# Patient Record
Sex: MALE | Race: WHITE | NOT HISPANIC OR LATINO | URBAN - NONMETROPOLITAN AREA
[De-identification: names, ages, dates, MRNs, and addresses within clinical notes are randomized per-mention and may not be internally consistent; named-entity substitution may affect disease eponyms.]

---

## 2017-02-27 ENCOUNTER — DOCTOR'S OFFICE (OUTPATIENT)
Dept: URBAN - NONMETROPOLITAN AREA CLINIC 1 | Facility: CLINIC | Age: 67
Setting detail: OPHTHALMOLOGY
End: 2017-02-27
Payer: COMMERCIAL

## 2017-02-27 DIAGNOSIS — H02.135: ICD-10-CM

## 2017-02-27 DIAGNOSIS — H43.811: ICD-10-CM

## 2017-02-27 DIAGNOSIS — H10.13: ICD-10-CM

## 2017-02-27 DIAGNOSIS — H01.004: ICD-10-CM

## 2017-02-27 DIAGNOSIS — H02.132: ICD-10-CM

## 2017-02-27 DIAGNOSIS — H40.10X1: ICD-10-CM

## 2017-02-27 DIAGNOSIS — H25.13: ICD-10-CM

## 2017-02-27 DIAGNOSIS — H04.123: ICD-10-CM

## 2017-02-27 DIAGNOSIS — H02.403: ICD-10-CM

## 2017-02-27 DIAGNOSIS — H01.001: ICD-10-CM

## 2017-02-27 PROCEDURE — 92133 CPTRZD OPH DX IMG PST SGM ON: CPT | Performed by: OPHTHALMOLOGY

## 2017-02-27 PROCEDURE — 92014 COMPRE OPH EXAM EST PT 1/>: CPT | Performed by: OPHTHALMOLOGY

## 2017-02-27 ASSESSMENT — REFRACTION_MANIFEST
OD_VA1: 20/
OS_VA3: 20/
OD_VA1: 20/
OS_VA1: 20/
OD_VA2: 20/
OS_VA1: 20/
OS_VA3: 20/
OD_VA3: 20/
OU_VA: 20/
OS_VA2: 20/
OU_VA: 20/
OS_VA2: 20/
OS_VA3: 20/
OD_VA2: 20/
OS_VA2: 20/
OU_VA: 20/
OD_VA3: 20/
OD_VA1: 20/
OD_VA2: 20/
OS_VA1: 20/
OD_VA3: 20/

## 2017-02-27 ASSESSMENT — LID EXAM ASSESSMENTS
OS_BLEPHARITIS: 1+
OD_BLEPHARITIS: 1+
OD_MEIBOMITIS: 1+
OS_MEIBOMITIS: 1+

## 2017-02-27 ASSESSMENT — REFRACTION_AUTOREFRACTION
OS_SPHERE: +2.00
OS_CYLINDER: -1.50
OD_AXIS: 179
OD_SPHERE: 0.00
OD_CYLINDER: -1.00
OS_AXIS: 17

## 2017-02-27 ASSESSMENT — LID POSITION - ECTROPION
OS_ECTROPION: LLL
OD_ECTROPION: RLL

## 2017-02-27 ASSESSMENT — SUPERFICIAL PUNCTATE KERATITIS (SPK)
OD_SPK: T
OS_SPK: 1+

## 2017-02-27 ASSESSMENT — VISUAL ACUITY
OS_BCVA: 20/30-2
OD_BCVA: 20/25+2

## 2017-02-27 ASSESSMENT — REFRACTION_CURRENTRX
OD_OVR_VA: 20/
OD_OVR_VA: 20/
OS_OVR_VA: 20/
OS_OVR_VA: 20/
OD_OVR_VA: 20/
OS_OVR_VA: 20/

## 2017-02-27 ASSESSMENT — SPHEQUIV_DERIVED
OD_SPHEQUIV: -0.5
OS_SPHEQUIV: 1.25

## 2017-02-27 ASSESSMENT — CONFRONTATIONAL VISUAL FIELD TEST (CVF)
OD_FINDINGS: FULL
OS_FINDINGS: FULL

## 2017-08-21 ENCOUNTER — DOCTOR'S OFFICE (OUTPATIENT)
Dept: URBAN - NONMETROPOLITAN AREA CLINIC 1 | Facility: CLINIC | Age: 67
Setting detail: OPHTHALMOLOGY
End: 2017-08-21
Payer: COMMERCIAL

## 2017-08-21 DIAGNOSIS — H40.033: ICD-10-CM

## 2017-08-21 DIAGNOSIS — H25.13: ICD-10-CM

## 2017-08-21 DIAGNOSIS — H04.123: ICD-10-CM

## 2017-08-21 DIAGNOSIS — H43.811: ICD-10-CM

## 2017-08-21 DIAGNOSIS — H02.135: ICD-10-CM

## 2017-08-21 DIAGNOSIS — H02.132: ICD-10-CM

## 2017-08-21 DIAGNOSIS — H02.403: ICD-10-CM

## 2017-08-21 DIAGNOSIS — H40.003: ICD-10-CM

## 2017-08-21 PROBLEM — H10.13 ALLERGIC CONJUNCTIVITS; BOTH EYES: Status: RESOLVED | Noted: 2017-02-27 | Resolved: 2017-08-21

## 2017-08-21 PROBLEM — H01.001 BLEPHARITIS; RIGHT UPPER LID, LEFT UPPER LID: Status: RESOLVED | Noted: 2017-02-27 | Resolved: 2017-08-21

## 2017-08-21 PROBLEM — H01.004 BLEPHARITIS; RIGHT UPPER LID, LEFT UPPER LID: Status: RESOLVED | Noted: 2017-02-27 | Resolved: 2017-08-21

## 2017-08-21 PROCEDURE — 92014 COMPRE OPH EXAM EST PT 1/>: CPT | Performed by: OPHTHALMOLOGY

## 2017-08-21 PROCEDURE — 92083 EXTENDED VISUAL FIELD XM: CPT | Performed by: OPHTHALMOLOGY

## 2017-08-21 PROCEDURE — 92132 CPTRZD OPH DX IMG ANT SGM: CPT | Performed by: OPHTHALMOLOGY

## 2017-08-21 PROCEDURE — 76514 ECHO EXAM OF EYE THICKNESS: CPT | Performed by: OPHTHALMOLOGY

## 2017-08-21 ASSESSMENT — REFRACTION_MANIFEST
OS_VA2: 20/
OS_VA1: 20/
OS_VA2: 20/
OD_VA3: 20/
OU_VA: 20/
OD_VA3: 20/
OD_VA2: 20/
OD_VA3: 20/
OS_VA2: 20/
OD_VA1: 20/
OS_VA1: 20/
OD_VA1: 20/
OS_VA1: 20/
OD_VA2: 20/
OS_VA3: 20/
OD_VA2: 20/
OD_VA1: 20/
OS_VA3: 20/
OU_VA: 20/
OU_VA: 20/
OS_VA3: 20/

## 2017-08-21 ASSESSMENT — REFRACTION_CURRENTRX
OS_OVR_VA: 20/
OD_OVR_VA: 20/
OS_OVR_VA: 20/
OD_OVR_VA: 20/
OD_OVR_VA: 20/
OS_OVR_VA: 20/

## 2017-08-21 ASSESSMENT — VISUAL ACUITY
OD_BCVA: 20/25
OS_BCVA: 20/30

## 2017-08-21 ASSESSMENT — PACHYMETRY
OD_CT_UM: 608
OS_CT_UM: 609
OS_CT_CORRECTION: -4
OD_CT_CORRECTION: -4

## 2017-08-21 ASSESSMENT — REFRACTION_AUTOREFRACTION
OS_SPHERE: +1.00
OS_AXIS: 17
OD_CYLINDER: -1.00
OD_SPHERE: +0.50
OS_CYLINDER: -1.50
OD_AXIS: 179

## 2017-08-21 ASSESSMENT — CONFRONTATIONAL VISUAL FIELD TEST (CVF)
OS_FINDINGS: FULL
OD_FINDINGS: FULL

## 2017-08-21 ASSESSMENT — LID POSITION - ECTROPION
OD_ECTROPION: RLL
OS_ECTROPION: LLL

## 2017-08-21 ASSESSMENT — SPHEQUIV_DERIVED
OS_SPHEQUIV: 0.25
OD_SPHEQUIV: 0

## 2017-08-21 ASSESSMENT — SUPERFICIAL PUNCTATE KERATITIS (SPK)
OS_SPK: 1+
OD_SPK: 1+

## 2018-04-16 ENCOUNTER — DOCTOR'S OFFICE (OUTPATIENT)
Dept: URBAN - NONMETROPOLITAN AREA CLINIC 1 | Facility: CLINIC | Age: 68
Setting detail: OPHTHALMOLOGY
End: 2018-04-16
Payer: COMMERCIAL

## 2018-04-16 DIAGNOSIS — H43.811: ICD-10-CM

## 2018-04-16 DIAGNOSIS — H02.135: ICD-10-CM

## 2018-04-16 DIAGNOSIS — H02.403: ICD-10-CM

## 2018-04-16 DIAGNOSIS — H04.121: ICD-10-CM

## 2018-04-16 DIAGNOSIS — H04.123: ICD-10-CM

## 2018-04-16 DIAGNOSIS — H40.003: ICD-10-CM

## 2018-04-16 DIAGNOSIS — H25.13: ICD-10-CM

## 2018-04-16 DIAGNOSIS — H04.122: ICD-10-CM

## 2018-04-16 DIAGNOSIS — H02.132: ICD-10-CM

## 2018-04-16 DIAGNOSIS — H40.033: ICD-10-CM

## 2018-04-16 PROCEDURE — 92014 COMPRE OPH EXAM EST PT 1/>: CPT | Performed by: OPHTHALMOLOGY

## 2018-04-16 PROCEDURE — 92133 CPTRZD OPH DX IMG PST SGM ON: CPT | Performed by: OPHTHALMOLOGY

## 2018-04-16 ASSESSMENT — CONFRONTATIONAL VISUAL FIELD TEST (CVF)
OS_FINDINGS: FULL
OD_FINDINGS: FULL

## 2018-04-16 ASSESSMENT — REFRACTION_MANIFEST
OD_VA1: 20/
OD_VA2: 20/
OS_VA1: 20/
OU_VA: 20/
OS_VA2: 20/
OS_VA2: 20/
OD_VA2: 20/
OU_VA: 20/
OS_VA3: 20/
OS_VA1: 20/
OU_VA: 20/
OD_VA3: 20/
OD_VA3: 20/
OD_VA1: 20/
OS_VA3: 20/
OD_VA2: 20/
OS_VA3: 20/
OD_VA3: 20/
OS_VA1: 20/
OD_VA1: 20/
OS_VA2: 20/

## 2018-04-16 ASSESSMENT — VISUAL ACUITY
OD_BCVA: 20/30+2
OS_BCVA: 20/25-1

## 2018-04-16 ASSESSMENT — REFRACTION_AUTOREFRACTION
OD_CYLINDER: -1.00
OD_AXIS: 179
OS_AXIS: 17
OS_SPHERE: +1.00
OS_CYLINDER: -1.50
OD_SPHERE: +0.50

## 2018-04-16 ASSESSMENT — SPHEQUIV_DERIVED
OS_SPHEQUIV: 0.25
OD_SPHEQUIV: 0

## 2018-04-16 ASSESSMENT — REFRACTION_CURRENTRX
OS_OVR_VA: 20/
OS_OVR_VA: 20/
OD_OVR_VA: 20/
OS_OVR_VA: 20/
OD_OVR_VA: 20/
OD_OVR_VA: 20/

## 2018-04-16 ASSESSMENT — LID POSITION - ECTROPION
OS_ECTROPION: LLL
OD_ECTROPION: RLL

## 2018-04-16 ASSESSMENT — SUPERFICIAL PUNCTATE KERATITIS (SPK)
OS_SPK: 1+
OD_SPK: ABSENT

## 2022-10-03 ENCOUNTER — OFFICE VISIT (OUTPATIENT)
Dept: FAMILY MEDICINE CLINIC | Facility: CLINIC | Age: 72
End: 2022-10-03
Payer: COMMERCIAL

## 2022-10-03 VITALS
BODY MASS INDEX: 30.49 KG/M2 | OXYGEN SATURATION: 95 % | SYSTOLIC BLOOD PRESSURE: 116 MMHG | HEART RATE: 84 BPM | HEIGHT: 65 IN | DIASTOLIC BLOOD PRESSURE: 78 MMHG | TEMPERATURE: 98.7 F | WEIGHT: 182.98 LBS

## 2022-10-03 DIAGNOSIS — N40.0 BENIGN PROSTATIC HYPERPLASIA WITHOUT LOWER URINARY TRACT SYMPTOMS: ICD-10-CM

## 2022-10-03 DIAGNOSIS — H61.23 IMPACTED CERUMEN OF BOTH EARS: ICD-10-CM

## 2022-10-03 DIAGNOSIS — Z23 FLU VACCINE NEED: ICD-10-CM

## 2022-10-03 DIAGNOSIS — Z00.00 ENCOUNTER FOR ANNUAL PHYSICAL EXAM: Primary | ICD-10-CM

## 2022-10-03 DIAGNOSIS — R53.81 PHYSICAL DECONDITIONING: ICD-10-CM

## 2022-10-03 DIAGNOSIS — F01.50 MULTI-INFARCT DEMENTIA, UNCOMPLICATED (HCC): ICD-10-CM

## 2022-10-03 DIAGNOSIS — I10 PRIMARY HYPERTENSION: ICD-10-CM

## 2022-10-03 DIAGNOSIS — M10.079 ACUTE IDIOPATHIC GOUT OF FOOT, UNSPECIFIED LATERALITY: ICD-10-CM

## 2022-10-03 PROCEDURE — G0008 ADMIN INFLUENZA VIRUS VAC: HCPCS

## 2022-10-03 PROCEDURE — 99204 OFFICE O/P NEW MOD 45 MIN: CPT | Performed by: PHYSICIAN ASSISTANT

## 2022-10-03 PROCEDURE — 90662 IIV NO PRSV INCREASED AG IM: CPT

## 2022-10-03 RX ORDER — VITAMIN B COMPLEX
1 CAPSULE ORAL DAILY
COMMUNITY

## 2022-10-03 RX ORDER — MEMANTINE HYDROCHLORIDE 10 MG/1
10 TABLET ORAL 2 TIMES DAILY
COMMUNITY
Start: 2022-09-15

## 2022-10-03 RX ORDER — DONEPEZIL HYDROCHLORIDE 10 MG/1
10 TABLET, FILM COATED ORAL DAILY
COMMUNITY
Start: 2022-08-28

## 2022-10-03 RX ORDER — CARVEDILOL 6.25 MG/1
6.25 TABLET ORAL DAILY
COMMUNITY
Start: 2022-08-09

## 2022-10-03 RX ORDER — ASPIRIN 325 MG
325 TABLET ORAL DAILY
COMMUNITY
End: 2022-10-06 | Stop reason: CLARIF

## 2022-10-03 RX ORDER — ALLOPURINOL 100 MG/1
100 TABLET ORAL DAILY
COMMUNITY
Start: 2022-08-29

## 2022-10-03 NOTE — PATIENT INSTRUCTIONS
Patient is going to have lab tests done so that we can do a full evaluation  He is going to be fasting for his lipid profile  We are also going to do his complete metabolic profile and look at his lipids, kidney function, and liver function  We will also be looking for anemia and checking his MCV to see if he is anemic to see what type of anemia he has  We will assess his hemoglobin hematocrit platelets and white count  I am encouraging the patient to exercise every day  He is going to come back in in 1 week to have his ear wax impaction removed and he is going to bring me a list of how long he has been walking every day in that 1 week  This is very important for him to exercise his body and to continue to do challenging exercises for his mind  He feels that the Namenda and the Aricept are slowing the memory loss down  He is no longer driving  I think that is a good idea because if he would get confused while he is driving he may get lost in unable to find his way home  I will see him in 1 week and hopefully we can have some labs back by that time and have a discussion about his labs
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
## 2022-10-06 RX ORDER — ASPIRIN 81 MG/1
81 TABLET ORAL
COMMUNITY

## 2022-10-10 ENCOUNTER — OFFICE VISIT (OUTPATIENT)
Dept: FAMILY MEDICINE CLINIC | Facility: CLINIC | Age: 72
End: 2022-10-10
Payer: COMMERCIAL

## 2022-10-10 VITALS
SYSTOLIC BLOOD PRESSURE: 128 MMHG | HEIGHT: 65 IN | BODY MASS INDEX: 30.49 KG/M2 | DIASTOLIC BLOOD PRESSURE: 80 MMHG | HEART RATE: 67 BPM | OXYGEN SATURATION: 98 % | TEMPERATURE: 98.2 F | WEIGHT: 182.98 LBS

## 2022-10-10 DIAGNOSIS — H61.23 BILATERAL IMPACTED CERUMEN: Primary | ICD-10-CM

## 2022-10-10 PROCEDURE — 69209 REMOVE IMPACTED EAR WAX UNI: CPT | Performed by: PHYSICIAN ASSISTANT

## 2022-10-10 PROCEDURE — 99213 OFFICE O/P EST LOW 20 MIN: CPT | Performed by: PHYSICIAN ASSISTANT

## 2022-10-10 NOTE — PROGRESS NOTES
Assessment/Plan:       1  Bilateral impacted cerumen  -     Ear cerumen removal  -     Ear cerumen removal    patient can in the office for the expressed purpose of having bilateral cerumen impaction removed  Patient had decreased hearing prior to the procedure  The impacted cerumen was identified during his last office visit and a recommendation was made to have Debrox ear drops in order to soften the cerumen prior to the flushing of the ears  Subjective:  Patient sees [address] [place] [address] primary care for primary care services     Patient ID: Rita Villanueva is a 74 y o  male  HPI : A 49-year-old male has established care with me as his primary care provider  On his last examination, it was noted he had bilateral cerumen impaction along with decreased hearing  Used Debrox ear drops on a daily basis NAD appointment to have the ear flushing performed  The following portions of the patient's history were reviewed and updated as appropriate: allergies, current medications, past family history, past medical history, past social history, past surgical history, and problem list     Review of Systems  patient admits having a hard time hearing for quite some time  He does have bilateral hearing aids that he intermittently uses  He was accompanied in the office by his wife who noted that the patient has difficulty hearing and that this hearing is worse in latter rooms  There is no tinnitus or vertigo  Objective:  Prior to the procedure, the patient had examination showing bilateral cerumen impaction left ear greater than right ear  /80 (BP Location: Right arm, Patient Position: Sitting)   Pulse 67   Temp 98 2 °F (36 8 °C) (Oral)   Ht 5' 5" (1 651 m)   Wt 83 kg (182 lb 15 7 oz)   SpO2 98%   BMI 30 45 kg/m²          Physical Exam  bilateral cerumen impaction noted prior to the flushing of the ear canals  Following the procedure, ear canals were clear without any retained cerumen

## 2022-10-10 NOTE — PATIENT INSTRUCTIONS
Patient was here today with bilateral cerumen impaction that was identified at his last visit  In order to expedite removal the patient has been using Debrox ear drops  Patient had flushing of both canals left ear demonstrated more wax removal than on the right side  Patient tolerated the procedure well without any vertigo or tinnitus  Santos test showed that equal hearing with the tuning fork  Air conduction greater than bone conduction on both sides  Had difficulty hearing whispered voice and difficulty hearing with finger rub bilaterally  He does have a hearing aid that he uses at home  I will see the patient in 3 months for recheck

## 2022-10-10 NOTE — PROGRESS NOTES
Name: Deborah Apodaca      : 1950      MRN: [id number]  Encounter Provider: Ila Guallpa PA-C  Encounter Date: 10/10/2022   [facility]    Assessment & Plan    1  Bilateral impacted cerumen  -     Ear cerumen removal  -     Ear cerumen removal    Subjective    HPI     Objective    /80 (BP Location: Right arm, Patient Position: Sitting)   Pulse 67   Temp 98 2 °F (36 8 °C) (Oral)   Ht 5' 5" (1 651 m)   Wt 83 kg (182 lb 15 7 oz)   SpO2 98%   BMI 30 45 kg/m²      Ear cerumen removal    Date/Time: 10/10/2022 3:19 PM  Performed by: Ila Guallpa PA-C  Authorized by: Ila Guallpa PA-C   [place] Protocol:  Consent: Verbal consent obtained  Written consent not obtained  Risks and benefits: risks, benefits and alternatives were discussed  Consent given by: patient  Time out: Immediately prior to procedure a "time out" was called to verify the correct patient, procedure, equipment, support staff and site/side marked as required  Timeout called at: 10/10/2022 3:20 AM   Patient understanding: patient states understanding of the procedure being performed  Patient consent: the patient's understanding of the procedure matches consent given  Procedure consent: procedure consent matches procedure scheduled  Relevant documents: relevant documents present and verified  Test results: test results available and properly labeled  Site marked: the operative site was not marked  Radiology Images displayed and confirmed  If images not available, report reviewed: imaging studies available      Patient location:  Clinic  Indications / Diagnosis:  Bilateral cerumen impaction  Procedure details:     Local anesthetic:  None    Location:  L ear and R ear    Procedure type: irrigation only      Visualization (free text):   Was present prior to procedure and clean canals following procedure    Equipment used:  Ear lavage with warm water and H202  Post-procedure details:     Complication:  None    Hearing quality:  Improved    Patient tolerance of procedure: Tolerated well, no immediate complications  Comments:      Santos test midline  AC> BC bilaterally for Rinne test  Still unable to hear finger rub, whispering bilaterally-- wears hearing aids and these were not applied following procedure         Jurgen Ellington PA-C

## 2022-10-24 ENCOUNTER — APPOINTMENT (OUTPATIENT)
Dept: LAB | Facility: CLINIC | Age: 72
End: 2022-10-24
Payer: COMMERCIAL

## 2022-10-24 DIAGNOSIS — I10 PRIMARY HYPERTENSION: ICD-10-CM

## 2022-10-24 DIAGNOSIS — Z00.00 ENCOUNTER FOR ANNUAL PHYSICAL EXAM: ICD-10-CM

## 2022-10-24 LAB
ALBUMIN SERPL BCP-MCNC: 3.6 G/DL (ref 3.5–5)
ALP SERPL-CCNC: 64 U/L (ref 46–116)
ALT SERPL W P-5'-P-CCNC: 28 U/L (ref 12–78)
ANION GAP SERPL CALCULATED.3IONS-SCNC: 5 MMOL/L (ref 4–13)
AST SERPL W P-5'-P-CCNC: 22 U/L (ref 5–45)
BILIRUB SERPL-MCNC: 1.05 MG/DL (ref 0.2–1)
BUN SERPL-MCNC: 20 MG/DL (ref 5–25)
CALCIUM SERPL-MCNC: 9.1 MG/DL (ref 8.3–10.1)
CHLORIDE SERPL-SCNC: 108 MMOL/L (ref 96–108)
CHOLEST SERPL-MCNC: 184 MG/DL
CO2 SERPL-SCNC: 28 MMOL/L (ref 21–32)
CREAT SERPL-MCNC: 0.79 MG/DL (ref 0.6–1.3)
ERYTHROCYTE [DISTWIDTH] IN BLOOD BY AUTOMATED COUNT: 13.7 % (ref 11.6–15.1)
GFR SERPL CREATININE-BSD FRML MDRD: 88 ML/MIN/1.73SQ M
GLUCOSE P FAST SERPL-MCNC: 98 MG/DL (ref 65–99)
HCT VFR BLD AUTO: 47.3 % (ref 36.5–49.3)
HDLC SERPL-MCNC: 77 MG/DL
HGB BLD-MCNC: 15.7 G/DL (ref 12–17)
LDLC SERPL CALC-MCNC: 98 MG/DL (ref 0–100)
MCH RBC QN AUTO: 33.4 PG (ref 26.8–34.3)
MCHC RBC AUTO-ENTMCNC: 33.2 G/DL (ref 31.4–37.4)
MCV RBC AUTO: 101 FL (ref 82–98)
NONHDLC SERPL-MCNC: 107 MG/DL
PLATELET # BLD AUTO: 150 THOUSANDS/UL (ref 149–390)
PMV BLD AUTO: 10.3 FL (ref 8.9–12.7)
POTASSIUM SERPL-SCNC: 4.4 MMOL/L (ref 3.5–5.3)
PROT SERPL-MCNC: 6.8 G/DL (ref 6.4–8.4)
RBC # BLD AUTO: 4.7 MILLION/UL (ref 3.88–5.62)
SODIUM SERPL-SCNC: 141 MMOL/L (ref 135–147)
TRIGL SERPL-MCNC: 46 MG/DL
WBC # BLD AUTO: 4.67 THOUSAND/UL (ref 4.31–10.16)

## 2022-10-24 PROCEDURE — 36415 COLL VENOUS BLD VENIPUNCTURE: CPT

## 2022-10-24 PROCEDURE — 80061 LIPID PANEL: CPT

## 2022-10-24 PROCEDURE — 80053 COMPREHEN METABOLIC PANEL: CPT

## 2022-10-24 PROCEDURE — 85027 COMPLETE CBC AUTOMATED: CPT

## 2022-12-29 DIAGNOSIS — M10.00 IDIOPATHIC GOUT, UNSPECIFIED CHRONICITY, UNSPECIFIED SITE: Primary | ICD-10-CM

## 2022-12-30 RX ORDER — ALLOPURINOL 100 MG/1
100 TABLET ORAL DAILY
Qty: 90 TABLET | Refills: 3 | Status: SHIPPED | OUTPATIENT
Start: 2022-12-30 | End: 2023-03-30

## 2023-01-12 ENCOUNTER — TELEPHONE (OUTPATIENT)
Dept: ADMINISTRATIVE | Facility: OTHER | Age: 73
End: 2023-01-12

## 2023-01-12 NOTE — TELEPHONE ENCOUNTER
----- Message from [facility] sent at 1/11/2023 10:31 AM EST -----  Regarding: Care Gap Request CRC: Colonoscopy  01/11/23 10:31 AM    Hello, our patient attached above has had CRC: Colonoscopy completed/performed  Please assist in updating the patient chart by pulling the Care Everywhere (CE) document  The date of service is 09/16/2016       Thank you,  Pia Leyva  [facility] PRIMARY [facility]

## 2023-01-12 NOTE — TELEPHONE ENCOUNTER
Upon review of the In Basket request we were able to locate, review, and update the patient chart as requested for CRC: Colonoscopy  Any additional questions or concerns should be emailed to the Practice Liaisons via the appropriate education email address, please do not reply via In Basket      Thank you  Yasmany Ramirez MA

## 2023-01-16 ENCOUNTER — OFFICE VISIT (OUTPATIENT)
Dept: FAMILY MEDICINE CLINIC | Facility: CLINIC | Age: 73
End: 2023-01-16

## 2023-01-16 VITALS
SYSTOLIC BLOOD PRESSURE: 160 MMHG | HEIGHT: 65 IN | HEART RATE: 64 BPM | WEIGHT: 179.23 LBS | DIASTOLIC BLOOD PRESSURE: 88 MMHG | BODY MASS INDEX: 29.86 KG/M2 | OXYGEN SATURATION: 96 % | TEMPERATURE: 97.2 F

## 2023-01-16 DIAGNOSIS — E78.2 MIXED HYPERLIPIDEMIA: ICD-10-CM

## 2023-01-16 DIAGNOSIS — I10 PRIMARY HYPERTENSION: ICD-10-CM

## 2023-01-16 DIAGNOSIS — Z00.00 MEDICARE ANNUAL WELLNESS VISIT, INITIAL: ICD-10-CM

## 2023-01-16 DIAGNOSIS — F01.50 MULTI-INFARCT DEMENTIA, UNCOMPLICATED (HCC): Primary | ICD-10-CM

## 2023-01-16 DIAGNOSIS — G47.33 OSA (OBSTRUCTIVE SLEEP APNEA): ICD-10-CM

## 2023-01-16 DIAGNOSIS — R29.6 RECURRENT FALLS: ICD-10-CM

## 2023-01-16 DIAGNOSIS — R53.81 PHYSICAL DECONDITIONING: ICD-10-CM

## 2023-01-16 DIAGNOSIS — E79.0 HYPERURICEMIA: ICD-10-CM

## 2023-01-16 DIAGNOSIS — H61.23 IMPACTED CERUMEN OF BOTH EARS: ICD-10-CM

## 2023-01-16 RX ORDER — PRAVASTATIN SODIUM 40 MG
40 TABLET ORAL
Qty: 90 TABLET | Refills: 3 | Status: SHIPPED | OUTPATIENT
Start: 2023-01-16

## 2023-01-16 RX ORDER — AMLODIPINE BESYLATE 5 MG/1
5 TABLET ORAL DAILY
Qty: 90 TABLET | Refills: 3 | Status: SHIPPED | OUTPATIENT
Start: 2023-01-16

## 2023-01-16 NOTE — PROGRESS NOTES
Assessment and Plan:     Problem List Items Addressed This Visit    None       Preventive health issues were discussed with patient, and age appropriate screening tests were ordered as noted in patient's After Visit Summary  Personalized health advice and appropriate referrals for health education or preventive services given if needed, as noted in patient's After Visit Summary  History of Present Illness:     Patient presents for a Medicare Wellness Visit    HPI   Patient Care Team:  Janna Olivia PA-C as PCP - General (Physician Assistant)     Review of Systems:     Review of Systems     Problem List:     Patient Active Problem List   Diagnosis   • Multi-infarct dementia, uncomplicated [facility])      Past Medical and Surgical History:     Past Medical History:   Diagnosis Date   • Vascular dementia [facility])      Past Surgical History:   Procedure Laterality Date   • REPLACEMENT TOTAL KNEE Left       Family History:     Family History   Problem Relation Age of Onset   • Alzheimer's disease Mother    • Heart disease Father       Social History:     Social History     Socioeconomic History   • Marital status: /Civil Union     Spouse name: None   • Number of children: None   • Years of education: None   • Highest education level: None   Occupational History   • None   Tobacco Use   • Smoking status: Former     Types: Cigarettes   • Smokeless tobacco: Never   • Tobacco comments:     quit at 34years old   Vaping Use   • Vaping Use: Never used   Substance and Sexual Activity   • Alcohol use: Yes     Comment: on occasion   • Drug use: Never   • Sexual activity: None   Other Topics Concern   • None   Social History Narrative   • None     Social Determinants of Health     Financial Resource Strain: High Risk   • Difficulty of Paying Living Expenses: Very hard   Food Insecurity: Not on file   Transportation Needs: No Transportation Needs   • Lack of Transportation (Medical):  No   • Lack of Transportation (Non-Medical): No   Physical Activity: Not on file   Stress: Not on file   Social Connections: Not on file   Intimate Partner Violence: Not on file   Housing Stability: Not on file      Medications and Allergies:     Current Outpatient Medications   Medication Sig Dispense Refill   • allopurinol (ZYLOPRIM) 100 mg tablet Take 1 tablet (100 mg total) by mouth in the morning 90 tablet 3   • aspirin (ECOTRIN LOW STRENGTH) 81 mg EC tablet Take 81 mg by mouth     • b complex vitamins capsule Take 1 capsule by mouth daily     • carvedilol (COREG) 6 25 mg tablet Take 6 25 mg by mouth in the morning     • donepezil (ARICEPT) 10 mg tablet Take 10 mg by mouth in the morning At night     • memantine (NAMENDA) 10 mg tablet Take 10 mg by mouth 2 (two) times a day Once in morning and once at night       No current facility-administered medications for this visit  Allergies   Allergen Reactions   • Sildenafil Visual Disturbance   • Atorvastatin Rash   • Celecoxib Rash      Immunizations:     Immunization History   Administered Date(s) Administered   • COVID-19 PFIZER VACCINE 0 3 ML IM 12/27/2021, 01/20/2022   • INFLUENZA 10/03/2022   • Influenza Quadrivalent 3 years and older 10/04/2017   • Influenza, high dose seasonal 0 7 mL 10/03/2022   • Influenza, seasonal, injectable 10/03/2008, 09/10/2009, 09/22/2010, 09/06/2011, 10/02/2012, 09/16/2013, 10/15/2014, 09/22/2015, 09/20/2016   • Pneumococcal Polysaccharide PPV23 11/23/2010, 06/03/2022   • Td (adult), adsorbed 10/03/2008      Health Maintenance:         Topic Date Due   • Hepatitis C Screening  Never done   • Colorectal Cancer Screening  09/01/2026         Topic Date Due   • COVID-19 Vaccine (3 - Booster for Pfizer series) 03/17/2022      Medicare Screening Tests and Risk Assessments:         Health Risk Assessment:   Patient rates overall health as good  Patient feels that their physical health rating is same  Patient is satisfied with their life   Eyesight was rated as slightly worse  Hearing was rated as slightly worse  Patient feels that their emotional and mental health rating is same  Patients states they are sometimes angry  Patient states they are never, rarely unusually tired/fatigued  Pain experienced in the last 7 days has been some  Patient's pain rating has been 3/10  Patient states that he has experienced no weight loss or gain in last 6 months  Fall Risk Screening: In the past year, patient has experienced: history of falling in past year    Number of falls: 2 or more  Injured during fall?: No    Feels unsteady when standing or walking?: No    Worried about falling?: No      Home Safety:  Patient does not have trouble with stairs inside or outside of their home  Patient has working smoke alarms and has no working carbon monoxide detector  Home safety hazards include: none  Nutrition:   Current diet is Regular  Medications:   Patient is currently taking over-the-counter supplements  OTC medications include: see medication list  Patient is not able to manage medications  Activities of Daily Living (ADLs)/Instrumental Activities of Daily Living (IADLs):   Walk and transfer into and out of bed and chair?: Yes  Dress and groom yourself?: Yes    Bathe or shower yourself?: Yes    Feed yourself?  Yes  Do your laundry/housekeeping?: No  Manage your money, pay your bills and track your expenses?: No  Make your own meals?: Yes    Do your own shopping?: Yes    Previous Hospitalizations:   Any hospitalizations or ED visits within the last 12 months?: No      PREVENTIVE SCREENINGS      Cardiovascular Screening:    General: Screening Current      Diabetes Screening:     General: Screening Current      Colorectal Cancer Screening:     General: Screening Current      Abdominal Aortic Aneurysm (AAA) Screening:    Risk factors include: age between 73-67 yo and tobacco use        Lung Cancer Screening:     General: Screening Not Indicated    Screening, Brief Intervention, and Referral to Treatment (SBIRT)    Screening  Typical number of drinks in a day: 2  Typical number of drinks in a week: 14  Interpretation: Low risk drinking behavior  No results found  Physical Exam:     There were no vitals taken for this visit      Physical Exam     Azeb Wolf PA-C

## 2023-01-16 NOTE — PROGRESS NOTES
Name: Lisa Lao      : 1950      MRN: [id number]  Encounter Provider: Linda Zamorano PA-C  Encounter Date: 2023   Encounter department: [facility]    Assessment & Plan    1  Multi-infarct dementia, uncomplicated ([address] )    2  Medicare annual wellness visit, initial    3  Hyperuricemia    4  Physical deconditioning    5  Primary hypertension  -     amLODIPine (NORVASC) 5 mg tablet; Take 1 tablet (5 mg total) by mouth daily    6  BMI 29 0-29 9,adult    7  Recurrent falls    8  DANY (obstructive sleep apnea)    9  Mixed hyperlipidemia  -     pravastatin (PRAVACHOL) 40 mg tablet; Take 1 tablet (40 mg total) by mouth daily at bedtime    10  Impacted cerumen of both ears  -     Ear cerumen removal    Subjective    HPI bilateral impacted cerumen asking for ear lavage  Objective    /88 (BP Location: Left arm, Patient Position: Sitting)   Pulse 64   Temp (!) 97 2 °F (36 2 °C) (Tympanic)   Ht 5' 5" (1 651 m)   Wt 81 3 kg (179 lb 3 7 oz)   SpO2 96%   BMI 29 83 kg/m²      Ear cerumen removal    Date/Time: 2023 4:24 PM  Performed by: Linda Zamorano PA-C  Authorized by: Linda Zamorano PA-C   Universal Protocol:  Consent: Verbal consent obtained  Written consent not obtained  Risks and benefits: risks, benefits and alternatives were discussed  Consent given by: patient  Time out: Immediately prior to procedure a "time out" was called to verify the correct patient, procedure, equipment, support staff and site/side marked as required    Patient understanding: patient states understanding of the procedure being performed  Patient consent: the patient's understanding of the procedure matches consent given  Procedure consent: procedure consent matches procedure scheduled  Relevant documents: relevant documents present and verified  Test results: test results available and properly labeled  Site marked: the operative site was not marked  Radiology Images displayed and confirmed  If images not available, report reviewed: imaging studies available  Required items: required blood products, implants, devices, and special equipment available      Patient location:  Clinic  Indications / Diagnosis:  Impacted cerumen with muffled hearing bilaterally  Procedure details:     Location:  L ear and R ear    Procedure type: irrigation only      Approach:  Natural orifice    Visualization (free text):  Cerumen noted both ears right side greater than left side prior to the procedure    Equipment used:  Room temperature water and hydrogen peroxide as a flush  Post-procedure details:     Complication:  None    Hearing quality:  Improved    Patient tolerance of procedure:   Tolerated well, no immediate complications      Kevan Simmons PA-C

## 2023-01-16 NOTE — PATIENT INSTRUCTIONS
She is here for follow-up  We reviewed his labs and he has a 36 2% 10-year risk for another acute episode from a cardiovascular standpoint  We will get a put him on pravastatin the water-soluble statin to take at night  His pressure is also elevated and he has had high blood pressure in the past   Get a give him low-dose amlodipine to take at night  Both the pravastatin and the amlodipine were sent to [facility] and he can start them tonight  Patient's wife is concerned about the patient's memory but he is safe in the home as he is not leaving the home  She prepares all of his food for him  I would like him to become more active  Sitting on the couch all day is not going to help getting your body more active and your mind more active  I would encourage word search puzzles or sudoku or crossword puzzles what ever is going to entertain your mind but I want you exercising your mind  He will follow-up with home exercises after physical therapy even though he is fallen a couple times  Therefore there is not a lot a reason to do physical therapy if he will follow with the outpatient follow-up  If he changes his mind and becomes motivated to do a home program after being taught exercises I will order physical therapy  He is already received his flu shot for this year

## 2023-01-16 NOTE — PROGRESS NOTES
Assessment/Plan:       1  Multi-infarct dementia, uncomplicated ([facility] [address] )    2  Medicare annual wellness visit, initial    3  Hyperuricemia    4  Physical deconditioning    5  Primary hypertension  -     amLODIPine (NORVASC) 5 mg tablet; Take 1 tablet (5 mg total) by mouth daily    6  BMI 29 0-29 9,adult    7  Recurrent falls    8  DANY (obstructive sleep apnea)    9  Mixed hyperlipidemia  -     pravastatin (PRAVACHOL) 40 mg tablet; Take 1 tablet (40 mg total) by mouth daily at bedtime    10  Impacted cerumen of both ears  -     Ear cerumen removal      This 73-year-old male sees me for his primary care services  He previously had been a patient at [facility] Anoop [facility]   Patient has had a series of mini strokes in the past with resultant multi-infarct dementia  He had been treated in the past for hypertension but was not getting a pure medication for his blood pressure  He was taking Coreg which primarily is a combined alpha and beta-blocker which will help in the setting of chronic heart failure more than hypertension  Patient is going to start taking amlodipine 5 mg nightly in order to ensure the nocturnal dip  His blood pressure was elevated today at 160/88  I had done some blood work on him in October when I first saw him  His 10-year risk score for major cardiovascular event is 36 2%  He is willing to try pravastatin 40 mg nightly  Patient thinks he may have had a slight rash with atorvastatin so we will try a water-soluble statin instead  Patient has had recurrent falls  He has physical deconditioning  He does very little work other than getting out of bed and sitting on a couch for the day  Patient was accompanied in the office by his wife who is concerned about his memory  Patient no longer drives  He had physical therapy several years ago and did make improvements but he would not comply with doing home exercise    For this reason, it does not make sense to put him on physical therapy when he does not have the motivation to follow-up with home exercise program     He has a history of hyperuricemia for which she takes allopurinol  He has not had gout for quite some time  Patient admits to eating lots of junk food  Although his weight is down to 179 and had been 190, his BMI is still 29 83  BMI is above normal  Nutrition recommendations include reducing portion sizes, decreasing overall calorie intake, 3-5 servings of fruits/vegetables daily and increasing intake of lean protein  Patient has a history of obstructive sleep apnea when he was heavier using his CPAP  He states that since he is lost his weight his breathing is much improved and he does not feel that he has the same need for CPAP therapy  He is also unwilling to go through another sleep study  Patient admits to having a lot of wax in his ears  Examination was consistent with impacted cerumen right ear greater than left ear and patient is willing to have his ears flushed once again  A total of 54 minutes was spent rendering care for this patient  This time included review of the patient's electronic medical record, performing the history and physical, reviewing appropriate labs and/or images, developing a treatment and assessment plan, answering patient's questions and concerns, and documenting the patient visit  I will see the patient in 4 months  Since his lab work was done in October, it would be too soon to repeat his yearly blood work which would have to be done after October  Subjective:      Patient ID: Mary Oleary is a 74 y o  male  HPI: This 75-year-old male says that he is quite satisfied with the quality of life  I performed a depression inventory and he had a score of 0 on the two-point screen  Patient states that he is not concerned about his memory and that he is just enjoying life  His wife who is his primary caregiver is working while taking care of him and is concerned about his downhill course    She states that she can see him digressing in his memory despite taking Aricept and Namenda  She manages all of his medications and he is adherent with the medications  She also prepares all of his meals  She states that he has a reluctance for showering and that it is getting harder and harder for her to convince him to take a shower  Patient denies any pain in his chest heart palpitations  He denies syncope or presyncope  He does have dyspnea upon exertion  He states that he could walk a full block or a flight of steps without stopping but his wife said that that is not accurate and that he would have to stop for several times for both of those activities  They have 2 dogs at the house and she states that she must take care of him because he does not even go outside to tie them on a leash  Patient does not have any headaches or neck stiffness  He states that he knows that he has some limitations and that this would mean that he would not be driving anymore  He denies getting lost when he was driving  He has not wandered outside of the home when his wife is not there  The following portions of the patient's history were reviewed and updated as appropriate: allergies, current medications, past family history, past medical history, past social history, past surgical history, and problem list     Review of Systems  Patient denies any recent URI or viral syndrome  He previously had his flu shot  He denies difficulty swallowing  He admits having muffled hearing in both ears which he blames on impacted cerumen  He denies any peripheral edema  He denies any black or bloody stools  He has no constipation but occasionally does have some fecal incontinence  He denies any difficulty passing his water      Objective:      /88 (BP Location: Left arm, Patient Position: Sitting)   Pulse 64   Temp (!) 97 2 °F (36 2 °C) (Tympanic)   Ht 5' 5" (1 651 m)   Wt 81 3 kg (179 lb 3 7 oz)   SpO2 96%   BMI 29 83 kg/m²          Physical Exam  Well-developed well-nourished 74y o  year old male who is cooperative with the exam   Patient is alert and oriented x3  Patient is appropriate in answering all questions  HEENT:  Normocephalic  PERRLA  EOMs intact  TMs have cerumen right side greater than left side obstructing bony landmarks  Santos test lateralized to the left  He had bone conduction greater than air conduction on the right and air conduction greater then bone conduction on the left  No tragus or pinnae tenderness  No pre or posterior auricular adenopathy  Sinuses without tenderness  Throat without hyperemia  Neck:  Supple without adenopathy  Thyroid midline without thyromegaly or bruits  No carotid bruits  Chest symmetric and nontender  Heart regular rate and rhythm  No murmur rubs or gallops  Point of maximum impulse not displaced  Lungs are clear to auscultation  Breathing is nonlabored  Aerating bases well  Abdomen round and soft positive bowel sounds without masses tenderness or organomegaly  Extremities reveal adequate peripheral pulses without peripheral edema

## 2023-05-09 ENCOUNTER — RA CDI HCC (OUTPATIENT)
Dept: OTHER | Facility: HOSPITAL | Age: 73
End: 2023-05-09

## 2023-05-09 NOTE — PROGRESS NOTES
[facility]  coding opportunities       Chart reviewed, no opportunity found:   [address]        Patients Insurance     Medicare Insurance: [employer]

## 2023-05-16 ENCOUNTER — OFFICE VISIT (OUTPATIENT)
Dept: FAMILY MEDICINE CLINIC | Facility: CLINIC | Age: 73
End: 2023-05-16

## 2023-05-16 VITALS
WEIGHT: 172.62 LBS | HEIGHT: 65 IN | DIASTOLIC BLOOD PRESSURE: 70 MMHG | OXYGEN SATURATION: 96 % | TEMPERATURE: 98.2 F | HEART RATE: 58 BPM | BODY MASS INDEX: 28.76 KG/M2 | SYSTOLIC BLOOD PRESSURE: 131 MMHG

## 2023-05-16 DIAGNOSIS — E78.2 MIXED HYPERLIPIDEMIA: ICD-10-CM

## 2023-05-16 DIAGNOSIS — H61.23 IMPACTED CERUMEN OF BOTH EARS: ICD-10-CM

## 2023-05-16 DIAGNOSIS — R26.9 GAIT DISTURBANCE: ICD-10-CM

## 2023-05-16 DIAGNOSIS — F01.50 MULTI-INFARCT DEMENTIA, UNCOMPLICATED (HCC): ICD-10-CM

## 2023-05-16 DIAGNOSIS — Z91.81 AT RISK FOR FALLS: ICD-10-CM

## 2023-05-16 DIAGNOSIS — I10 ESSENTIAL HYPERTENSION: ICD-10-CM

## 2023-05-16 RX ORDER — METHYLPHENIDATE HYDROCHLORIDE 5 MG/1
TABLET ORAL
COMMUNITY
Start: 2023-04-19

## 2023-05-16 NOTE — PROGRESS NOTES
"Name: Kaitlin Connelly      : 1950      MRN: [id number]  Encounter Provider: Cara Stephenson PA-C  Encounter Date: 2023   [facility]    Assessment & Plan    1  BMI 28 0-28 9,adult    2  Multi-infarct dementia, uncomplicated ([address] )    3  At risk for falls    4  Gait disturbance    5  Essential hypertension    6  Impacted cerumen of both ears  -     Ear cerumen removal    7  Mixed hyperlipidemia    Subjective    HPI patient expresses a desire to have both of ears cleaned out from impacted cerumen  He has had this done on 2 previous occasions  Objective    /70 (BP Location: Right arm, Patient Position: Sitting, Cuff Size: Standard)   Pulse 58   Temp 98 2 °F (36 8 °C) (Tympanic)   Ht 5' 5\" (1 651 m)   Wt 78 3 kg (172 lb 9 9 oz)   SpO2 96%   BMI 28 73 kg/m²      Ear cerumen removal    Date/Time: 2023 2:40 AM  Performed by: Cara Stephenson PA-C  Authorized by: Cara Stephenson PA-C   Universal Protocol:  Consent: Verbal consent obtained  Written consent not obtained  Risks and benefits: risks, benefits and alternatives were discussed  Consent given by: patient  Time out: Immediately prior to procedure a \"time out\" was called to verify the correct patient, procedure, equipment, support staff and site/side marked as required  Patient understanding: patient states understanding of the procedure being performed  Patient consent: the patient's understanding of the procedure matches consent given  Procedure consent: procedure consent matches procedure scheduled  Relevant documents: relevant documents present and verified  Test results: test results available and properly labeled  Site marked: the operative site was not marked  Radiology Images displayed and confirmed   If images not available, report reviewed: imaging studies available  Required items: required blood products, implants, devices, and special equipment " available  Patient identity confirmed: verbally with patient      Patient location:  Clinic  Indications / Diagnosis:  Bilaterally impacted cerumen  Procedure details:     Local anesthetic:  None    Location:  L ear and R ear    Procedure type: irrigation only      Approach:  Natural orifice    Visualization (free text): Impacted cerumen seen in both ears  Following irrigation, TMs are clean without any signs of trauma or bleeding  Post-procedure details:     Complication:  None    Hearing quality:  Improved    Patient tolerance of procedure:   Tolerated well, no immediate complications      Candy Rashid PA-C

## 2023-05-16 NOTE — PATIENT INSTRUCTIONS
Fall Prevention   AMBULATORY CARE:   Fall prevention  includes ways to make your home and other areas safer  It also includes ways you can move more carefully to prevent a fall  Health conditions that cause changes in your blood pressure, vision, or muscle strength and coordination may increase your risk for falls  Medicines may also increase your risk for falls if they make you dizzy, weak, or sleepy  Call your local emergency number (911 in the [address]) or have someone call if:   • You have fallen and are unconscious  • You have fallen and cannot move part of your body  Call your doctor if:   • You have fallen and have pain or a headache  • You have questions or concerns about your condition or care  Fall prevention tips:   • Stand or sit up slowly  This may help you keep your balance and prevent falls  • Use assistive devices as directed  Your healthcare provider may suggest that you use a cane or walker to help you keep your balance  You may need to have grab bars put in your bathroom near the toilet or in the shower  • Wear shoes that fit well and have soles that   Wear shoes both inside and outside  Use slippers with good   Do not wear shoes with high heels  • Wear a personal alarm  This is a device that allows you to call 911 if you fall and need help  Ask your healthcare provider for more information  • Stay active  Exercise can help strengthen your muscles and improve your balance  Your healthcare provider may recommend water aerobics or walking  He or she may also recommend physical therapy to improve your coordination  Never start an exercise program without talking to your healthcare provider first          • Manage your medical conditions  Keep all appointments with your healthcare providers  Visit your eye doctor as directed  Home safety tips:       • Add items to prevent falls in the bathroom    Put nonslip strips on your bath or shower floor to prevent you from slipping  Use a bath mat if you do not have carpet in the bathroom  This will prevent you from falling when you step out of the bath or shower  Use a shower seat so you do not need to stand while you shower  Sit on the toilet or a chair in your bathroom to dry yourself and put on clothing  This will prevent you from losing your balance from drying or dressing yourself while you are standing  • Keep paths clear  Remove books, shoes, and other objects from walkways and stairs  Place cords for telephones and lamps out of the way so that you do not need to walk over them  Tape them down if you cannot move them  Remove small rugs  If you cannot remove a rug, secure it with double-sided tape  This will prevent you from tripping  • Install bright lights in your home  Use night lights to help light paths to the bathroom or kitchen  Always turn on the light before you start walking  • Keep items you use often on shelves within reach  Do not use a step stool to help you reach an item  • Paint or place reflective tape on the edges of your stairs  This will help you see the stairs better  Follow up with your doctor as directed:  Write down your questions so you remember to ask them during your visits  © Copyright [employer] 2022 Information is for End User's use only and may not be sold, redistributed or otherwise used for commercial purposes  The above information is an  only  It is not intended as medical advice for individual conditions or treatments  Talk to your doctor, nurse or pharmacist before following any medical regimen to see if it is safe and effective for you

## 2023-05-16 NOTE — PROGRESS NOTES
Falls Plan of Care: Balance, strength, and gait training instructions were provided  Due to lack of transportation, he could not attend physical therapy for core strengthening and balance exercises  His wife works 6 days a week cleaning houses and she is the only  in the home  Patient is no longer driving due to his dementia and there would be no other way of getting him to physical therapy  Assessment/Plan:       1  BMI 28 0-28 9,adult    2  Multi-infarct dementia, uncomplicated ([address] )    3  At risk for falls    4  Gait disturbance    5  Essential hypertension    6  Impacted cerumen of both ears  -     Ear cerumen removal    7  Mixed hyperlipidemia      Patient sees me for his primary care services  He is on both donepezil and Namenda for his progressive moderate dementia  On mental state exam today, patient was able to interpret proverbs  He was able to spell the word world forwards and backwards  He was able to repeat the 3 words but recalled 0 of those words 5 minutes later  He is complaining of impacted cerumen and earwax was removed from both ears without incident  Patient admits being hard of hearing in both years  He has no tinnitus vertigo dizziness lightheadedness  He had a fall 2 weeks ago without injury  He uses a cane for ambulation but remains very unstable and he does hold onto a lot of objects at home when ambulating  Did not hit his head when he fell and just had a misstep causing him to fall  Patient's BMI is 28  BMI is above normal  Nutrition recommendations include reducing portion sizes and decreasing overall calorie intake  Exercise recommendations include moderate aerobic physical activity for 150 minutes/week and exercising 3-5 times per week  Patient has a history of hypertension  His wife gives him his medication as he cannot remember to take them on his own  Blood pressure well controlled today    He also has hyperlipidemia with a 31 8% 10-year risk which is why pravastatin has been started for him  A total of 60 minutes was spent rendering care for this patient  This time also included bilateral ear lavage  This time included review of the patient's electronic medical record, performing the history and physical, reviewing appropriate labs and/or images, developing a treatment and assessment plan, answering patient's questions and concerns, and documenting the patient visit  Subjective:      Patient ID: Gaurang Novak is a 74 y o  male  HPI 77-year-old male who is pleasant and cooperative with the exam   He is able to repeat the words and register the words but he cannot recall the words 5 minutes later  He had a 0 out of 3 word recall  He was able to spell the word world forwards and backwards  He was able to appropriately answer the judgment questions and to interpret the problems  Patient has not been very active at home  He spends most of the time watching television  He is also working on some word finding puzzles and enjoys watching movies and he states that he usually can follow the plot line without getting confused  He is not able to take care of the 2 dogs at his home and his wife does all of the housework cleaning cooking shopping and works 6 days outside the home due to financial need  He does have 1 son who he has little connection with  His daughter  at age 43 after a life of drug addiction  He is hesitant to call his son to ask for any assistance and does feel bad about that situation  He denies any pain in his chest heart palpitations dizziness lightheadedness coughing or sputum production  He denies abdominal pain  He had a gastric lap band several years ago and continues to have the port in the anterior abdominal wall but has not had any adjustment in the band for several years  He is not interested in getting a port removed at this time  His weight has been stable but he is no longer losing any weight    He denies "changes in his bowel or bladder habits any melena or hematochezia  The following portions of the patient's history were reviewed and updated as appropriate: allergies, current medications, past family history, past medical history, past social history, past surgical history, and problem list     Review of Systems  Patient denies any peripheral edema  He had his left knee replaced many years ago and it is doing well  He denies other joint aches or pains  He does not feel afraid with walking  He does not feel unstable  I did demonstrate sitting to standing activities and I am asking him to do 3 sets of 10 in order to help with his core strength since he cannot attend physical therapy on his own  Objective:      /70 (BP Location: Right arm, Patient Position: Sitting, Cuff Size: Standard)   Pulse 58   Temp 98 2 °F (36 8 °C) (Tympanic)   Ht 5' 5\" (1 651 m)   Wt 78 3 kg (172 lb 9 9 oz)   SpO2 96%   BMI 28 73 kg/m²          Physical Exam  Well-developed well-nourished 74y o  year old male who is cooperative with the exam   Patient is alert and oriented x3  Patient is appropriate in answering all questions  HEENT:  Normocephalic  PERRLA  EOMs intact  TMs are with cerumen bilaterally left side worse than the right side  Following the ear lavage the TMs are clear with identification of bony landmarks  No tragus or pinnae tenderness  No pre or posterior auricular adenopathy  Santos test does not lateralize  Iraida test showed AC greater than BC bilaterally  Sinuses without tenderness  Throat without hyperemia  Neck:  Supple without adenopathy  Thyroid midline without thyromegaly or bruits  No carotid bruits  Chest symmetric and nontender  Heart regular rate and rhythm  No murmur rubs or gallops  Point of maximum impulse not displaced  Lungs are clear to auscultation  Breathing is nonlabored  Aerating bases well    Abdomen round and soft positive bowel sounds without masses " tenderness or organomegaly  Extremities reveal adequate peripheral pulses without peripheral edema

## 2023-08-10 ENCOUNTER — RA CDI HCC (OUTPATIENT)
Dept: OTHER | Facility: HOSPITAL | Age: 73
End: 2023-08-10

## 2023-08-10 NOTE — PROGRESS NOTES
[address] [facility] coding opportunities       Chart reviewed, no opportunity found: [address]        Patients Insurance     Medicare Insurance: [employer]

## 2023-08-21 ENCOUNTER — OFFICE VISIT (OUTPATIENT)
Dept: FAMILY MEDICINE CLINIC | Facility: CLINIC | Age: 73
End: 2023-08-21
Payer: COMMERCIAL

## 2023-08-21 VITALS
OXYGEN SATURATION: 96 % | HEART RATE: 63 BPM | TEMPERATURE: 98.1 F | SYSTOLIC BLOOD PRESSURE: 128 MMHG | BODY MASS INDEX: 27.92 KG/M2 | HEIGHT: 65 IN | WEIGHT: 167.55 LBS | DIASTOLIC BLOOD PRESSURE: 78 MMHG

## 2023-08-21 DIAGNOSIS — E55.9 VITAMIN D DEFICIENCY: ICD-10-CM

## 2023-08-21 DIAGNOSIS — E78.2 MIXED HYPERLIPIDEMIA: ICD-10-CM

## 2023-08-21 DIAGNOSIS — F03.918 DEMENTIA WITH BEHAVIORAL DISTURBANCE (HCC): Primary | ICD-10-CM

## 2023-08-21 DIAGNOSIS — F01.50 MULTI-INFARCT DEMENTIA, UNCOMPLICATED (HCC): ICD-10-CM

## 2023-08-21 DIAGNOSIS — I10 ESSENTIAL HYPERTENSION: ICD-10-CM

## 2023-08-21 DIAGNOSIS — F98.8 ATTENTION DEFICIT DISORDER (ADD) IN ADULT: ICD-10-CM

## 2023-08-21 PROCEDURE — 99214 OFFICE O/P EST MOD 30 MIN: CPT | Performed by: PHYSICIAN ASSISTANT

## 2023-08-21 RX ORDER — METHYLPHENIDATE HYDROCHLORIDE 10 MG/1
TABLET ORAL
COMMUNITY
Start: 2023-07-10

## 2023-08-21 NOTE — PROGRESS NOTES
Assessment/Plan:       1. Dementia with behavioral disturbance ([address])    2. Essential hypertension    3. Vitamin D deficiency    4. BMI 27.0-27.9,adult    5. Attention deficit disorder (ADD) in adult    6. Multi-infarct dementia, uncomplicated ([address])    7. Mixed hyperlipidemia  -     Lipid panel; Future        This 12-year-old male sees me for his primary care services. He was accompanied by his wife. He is adherent with his blood pressure medications primarily carvedilol and amlodipine. Blood pressure readings show good control today. We reviewed the letter from Dr. Kim Escobedo who is taking care of his memory issues. He is on both Namenda and Aricept and this seems to have slow the progression of his multi-infarct dementia. MRI results show small vessel ischemia consistent with silent strokes along with cerebral atrophy on his imaging. He scored a 91 according to his wife on the full mental state exam.    Patient does not drive anymore as he gets confused very easily. He does have some behavioral disturbances doing things such as throwing his pills after they are placed in containers. He is wife reminds him to take his medication on a regular basis. Patient has had more attention to detail ever since being started on methylphenidate. This was given to him by his neurologist with good effects. He continues to take 50,000 units of vitamin D on a weekly basis. Patient's BMI slightly elevated at 27.88. BMI is above normal. Nutrition recommendations include reducing portion sizes and 3-5 servings of fruits/vegetables daily. Did lose 5 pounds since May eating a healthier diet. Patient does have a large yard but does not walk very often in the yard because of the high he will. He attempts to get activity during the day but spends a lot of times on his porch. He is mentally stimulating himself by doing word puzzles and reading the newspaper. Patient is adherent with his pravastatin.   His last lipid profile showed a 31% 10-year ASCVD risk. He was started on pravastatin and we will be rechecking his lipids in 3 months before he sees me. A total of 35 minutes was spent rendering care for this patient. This time included review of the patient's electronic medical record, performing the history and physical, reviewing appropriate labs and/or images, developing a treatment and assessment plan, answering patient's questions and concerns, and documenting the patient visit. I will see him in 3 months and he will have a lipid profile done 1 week before his next visit. He is being very well taking care of by his wife who continues to work. She states that he is safe at home as he is not doing much at home when he is unsupervised. She will not let him drive and I agree with that decision due to his inability to concentrate and tendency to get lost.    Subjective:      Patient ID: El Lopez is a 74 y.o. male. HPI: Well-developed well-nourished 58-year-old male who is accompanied by his wife. She does seem frustrated at times working full-time and also taking care of him whenever she is not at work. This is a stressful time for her. She states that he could help her more around the house and his neurologist also suggested that he be given duties to help her around the house. Patient denies pain in his chest heart palpitations dizziness lightheadedness. He denies recent URI or viral syndrome. He denies any changes in his bowel habits including melena or hematochezia. No difficulty passing his water. He has had his knees replaced in the past and he is doing well. He is not having any other joint pain or stiffness at this time.     The following portions of the patient's history were reviewed and updated as appropriate: allergies, current medications, past family history, past medical history, past social history, past surgical history, and problem list.    Review of Systems  No recent URI or viral syndrome. Objective:      /78 (BP Location: Right arm, Patient Position: Sitting, Cuff Size: Standard)   Pulse 63   Temp 98.1 °F (36.7 °C) (Tympanic)   Ht 5' 5" (1.651 m)   Wt 76 kg (167 lb 8.8 oz)   SpO2 96%   BMI 27.88 kg/m²          Physical Exam  Well-developed well-nourished 57-year-old male who is alert and oriented. He is appropriately answering questions. Depression scale was 0 and his anxiety scale testing today was 0. Patient's heart is regular rate without murmur rub or gallops. Lungs are clear to auscultation. He has some decreased breath sounds in his periphery. He was able to speak in full sentences. Abdomen is round soft positive bowel sounds without masses or tenderness. Extremities revealed adequate peripheral pulses and no peripheral edema.

## 2023-11-09 ENCOUNTER — APPOINTMENT (OUTPATIENT)
Dept: LAB | Facility: CLINIC | Age: 73
End: 2023-11-09
Payer: COMMERCIAL

## 2023-11-09 DIAGNOSIS — E78.2 MIXED HYPERLIPIDEMIA: ICD-10-CM

## 2023-11-09 LAB
CHOLEST SERPL-MCNC: 141 MG/DL
HDLC SERPL-MCNC: 64 MG/DL
LDLC SERPL CALC-MCNC: 57 MG/DL (ref 0–100)
NONHDLC SERPL-MCNC: 77 MG/DL
TRIGL SERPL-MCNC: 102 MG/DL

## 2023-11-09 PROCEDURE — 36415 COLL VENOUS BLD VENIPUNCTURE: CPT

## 2023-11-09 PROCEDURE — 80061 LIPID PANEL: CPT

## 2023-11-21 ENCOUNTER — OFFICE VISIT (OUTPATIENT)
Dept: FAMILY MEDICINE CLINIC | Facility: CLINIC | Age: 73
End: 2023-11-21
Payer: COMMERCIAL

## 2023-11-21 VITALS
DIASTOLIC BLOOD PRESSURE: 71 MMHG | OXYGEN SATURATION: 96 % | HEART RATE: 64 BPM | HEIGHT: 65 IN | TEMPERATURE: 97.9 F | WEIGHT: 162.7 LBS | BODY MASS INDEX: 27.11 KG/M2 | SYSTOLIC BLOOD PRESSURE: 131 MMHG

## 2023-11-21 DIAGNOSIS — E55.9 VITAMIN D DEFICIENCY: ICD-10-CM

## 2023-11-21 DIAGNOSIS — F01.50 MULTI-INFARCT DEMENTIA, UNCOMPLICATED (HCC): ICD-10-CM

## 2023-11-21 DIAGNOSIS — E78.2 MIXED HYPERLIPIDEMIA: ICD-10-CM

## 2023-11-21 DIAGNOSIS — I10 ESSENTIAL HYPERTENSION: ICD-10-CM

## 2023-11-21 DIAGNOSIS — Z23 ENCOUNTER FOR IMMUNIZATION: ICD-10-CM

## 2023-11-21 DIAGNOSIS — Z11.59 NEED FOR HEPATITIS C SCREENING TEST: Primary | ICD-10-CM

## 2023-11-21 DIAGNOSIS — H61.23 IMPACTED CERUMEN OF BOTH EARS: ICD-10-CM

## 2023-11-21 PROCEDURE — 90662 IIV NO PRSV INCREASED AG IM: CPT | Performed by: PHYSICIAN ASSISTANT

## 2023-11-21 PROCEDURE — 90677 PCV20 VACCINE IM: CPT | Performed by: PHYSICIAN ASSISTANT

## 2023-11-21 PROCEDURE — 99214 OFFICE O/P EST MOD 30 MIN: CPT | Performed by: PHYSICIAN ASSISTANT

## 2023-11-21 PROCEDURE — G0009 ADMIN PNEUMOCOCCAL VACCINE: HCPCS | Performed by: PHYSICIAN ASSISTANT

## 2023-11-21 PROCEDURE — G0008 ADMIN INFLUENZA VIRUS VAC: HCPCS | Performed by: PHYSICIAN ASSISTANT

## 2023-11-21 PROCEDURE — 69209 REMOVE IMPACTED EAR WAX UNI: CPT | Performed by: PHYSICIAN ASSISTANT

## 2023-11-21 NOTE — PROGRESS NOTES
Name: Rk Moran      : 1950      MRN: [id number]  Encounter Provider: Haily Hancock PA-C  Encounter Date: 2023   Encounter department: [facility]    Assessment & Plan    1. Need for hepatitis C screening test  -     Hepatitis C Antibody; Future    2. Encounter for immunization  -     Pneumococcal Conjugate Vaccine 20-valent (PCV20)  -     influenza vaccine, high-dose, PF 0.7 mL (FLUZONE HIGH-DOSE)    3. Essential hypertension    4. Multi-infarct dementia, uncomplicated ([address])    5. Vitamin D deficiency    6. Impacted cerumen of both ears    78-year-old male seen today in the office accompanied by his wife Nate. Patient has multi-infarct dementia as verified by his neurologist Dr. Marlo López. Patient has been started on both Aricept and Namenda. Patient states he feels that his memory is not getting worse. Patient's wife states that she has not noticed any improvement but has not noticed further regression in his mental state. There appeared to be a lot of conflict between the 2 parties. Patient's wife said she is very resentful of her  and the life that he led formerly. 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 Patient states that his daughter  several years ago. He has 1 son living in the [facility] area who has no contact with him. We had a long discussion today in that regard and I had suggested that the patient reach out to his son and let his son know how much the patient misses him. If the son does spend some time with his father, his wife would get a much-needed break. Patient's wife prepares all meals does the shopping and most of the housekeeping. She does give him a list of duties that need to be done and most of the time, these duties are not completed.   Patient does not have a good excuse other than saying that he forgets to do these things. He is going to try to accomplish his chores when his wife leaves from work before he sits in his sofa and watches television for the day. Patient is not having any difficulty sleeping. He goes to bed at 11 and gets up between 7 and 8. He is not having any pain in his chest or any heart palpitations. Patient is not very active. He states that he is taking care of 2 dogs during the day. He said that he walked them but his wife said that he is not capable of walking them. He does have stairs in his home and holds onto the railings going up and down the stairs. He states he has not fallen in the last year. Patient does admit decreased hearing in both ears secondary to having earwax in both ears. He wished these to be cleaned out. A total of 38 minutes was spent rendering care for this patient. This time included review of the patient's electronic medical record, performing the history and physical, reviewing appropriate labs and/or images, developing a treatment and assessment plan, answering patient's questions and concerns, and documenting the patient visit. I will see the patient in 3 months to do his Medicare annual exam.  Subjective    HPI patient is pleasant. He tries hard to answer questions. He has 0 out of 3 word recall. He was able to say the months of the year forward and backwards. He was able to spell the word world forward and backwards. He had difficulty translating proverbs as he was very literal.  Patient had difficulty naming what season but he knew it was November and knew the year. He knew he was in the clinic office to be assessed for his health. He had a great deal of difficulty drawing his clock. His wife actually got out of her seat grabbed the paper and put the numbers on the clock as the patient was having difficulty. The patient was then able to put the hands on the clock at 11:10.     Patient denies pain in his chest heart palpitations dizziness lightheadedness syncope or presyncope. He denies difficulty moving his bowels or passing his water. He denied peripheral edema. Objective    /71 (BP Location: Left arm, Patient Position: Sitting, Cuff Size: Standard)   Pulse 64   Temp 97.9 °F (36.6 °C) (Tympanic)   Ht 5' 5" (1.651 m)   Wt 73.8 kg (162 lb 11.2 oz)   SpO2 96%   BMI 27.07 kg/m²      Vital signs reviewed. Blood pressure is controlled. He is afebrile. Examination of both ears showed impacted cerumen preprocedure with clear TMs after. Patient's heart is regular rate without murmur rub or gallops. Lungs are clear to auscultation. Ear cerumen removal    Date/Time: 11/21/2023 3:00 PM    Performed by: Krysta Santo PA-C  Authorized by: Krysta Santo PA-C  Universal Protocol:  Consent: Verbal consent not obtained. Written consent not obtained. Risks and benefits: risks, benefits and alternatives were discussed  Consent given by: patient  Time out: Immediately prior to procedure a "time out" was called to verify the correct patient, procedure, equipment, support staff and site/side marked as required. Patient understanding: patient states understanding of the procedure being performed  Patient consent: the patient's understanding of the procedure matches consent given  Procedure consent: procedure consent matches procedure scheduled  Relevant documents: relevant documents present and verified  Test results: test results available and properly labeled  Site marked: the operative site was not marked  Radiology Images displayed and confirmed.  If images not available, report reviewed: imaging studies available  Required items: required blood products, implants, devices, and special equipment available  Patient identity confirmed: verbally with patient    Patient location:  Clinic  Indications / Diagnosis:  Impacted cerumen bilaterally  Procedure details:     Local anesthetic: None    Location:  L ear and R ear    Procedure type: irrigation only      Approach:  Natural orifice endoscopic    Visualization (free text): Impacted cerumen bilaterally prior to the procedure; clear TMs following procedure    Equipment used:  Ear lavage  Post-procedure details:     Complication:  None    Hearing quality:  Improved    Patient tolerance of procedure: Tolerated well, no immediate complications  Comments:      Santos test showed no lateralization following the ear lavage. Iraida exam showed air conduction greater than bone conduction bilaterally.     Magda Foster PA-C

## 2024-01-01 DIAGNOSIS — M10.00 IDIOPATHIC GOUT, UNSPECIFIED CHRONICITY, UNSPECIFIED SITE: ICD-10-CM

## 2024-01-02 RX ORDER — ALLOPURINOL 100 MG/1
100 TABLET ORAL DAILY
Qty: 90 TABLET | Refills: 0 | Status: SHIPPED | OUTPATIENT
Start: 2024-01-02

## 2024-01-16 DIAGNOSIS — E78.2 MIXED HYPERLIPIDEMIA: ICD-10-CM

## 2024-01-16 RX ORDER — PRAVASTATIN SODIUM 40 MG
40 TABLET ORAL
Qty: 90 TABLET | Refills: 1 | Status: SHIPPED | OUTPATIENT
Start: 2024-01-16

## 2024-02-15 ENCOUNTER — RA CDI HCC (OUTPATIENT)
Dept: OTHER | Facility: HOSPITAL | Age: 74
End: 2024-02-15

## 2024-02-16 DIAGNOSIS — I10 ESSENTIAL HYPERTENSION: Primary | ICD-10-CM

## 2024-02-16 RX ORDER — CARVEDILOL 6.25 MG/1
6.25 TABLET ORAL DAILY
Qty: 90 TABLET | Refills: 1 | Status: SHIPPED | OUTPATIENT
Start: 2024-02-16

## 2024-02-16 NOTE — TELEPHONE ENCOUNTER
Reason for call:   [x] Refill   [] Prior Auth  [] Other:     Office:   [x] PCP/Provider - [facility]  [] Specialty/Provider -     Medication: Carvedilol    Dose/Frequency: 6.25 mg/ QD AM    Quantity: 90    Pharmacy:   [id number] [place], [place] - [address] [place] [address]  P: [phone]  F: [phone]    Does the patient have enough for 3 days?   [] Yes   [x] No - Send as HP to POD

## 2024-02-20 ENCOUNTER — TELEPHONE (OUTPATIENT)
Dept: FAMILY MEDICINE CLINIC | Facility: CLINIC | Age: 74
End: 2024-02-20

## 2024-02-21 ENCOUNTER — OFFICE VISIT (OUTPATIENT)
Dept: FAMILY MEDICINE CLINIC | Facility: CLINIC | Age: 74
End: 2024-02-21
Payer: COMMERCIAL

## 2024-02-21 VITALS
OXYGEN SATURATION: 95 % | BODY MASS INDEX: 25.49 KG/M2 | WEIGHT: 153 LBS | DIASTOLIC BLOOD PRESSURE: 88 MMHG | HEART RATE: 79 BPM | HEIGHT: 65 IN | SYSTOLIC BLOOD PRESSURE: 138 MMHG

## 2024-02-21 DIAGNOSIS — H61.23 BILATERAL IMPACTED CERUMEN: ICD-10-CM

## 2024-02-21 DIAGNOSIS — R62.7 ADULT FAILURE TO THRIVE: ICD-10-CM

## 2024-02-21 DIAGNOSIS — G30.9 ALZHEIMER DISEASE (HCC): ICD-10-CM

## 2024-02-21 DIAGNOSIS — F02.80 ALZHEIMER DISEASE (HCC): ICD-10-CM

## 2024-02-21 DIAGNOSIS — R29.6 RECURRENT FALLS: ICD-10-CM

## 2024-02-21 DIAGNOSIS — Z00.00 MEDICARE ANNUAL WELLNESS VISIT, SUBSEQUENT: Primary | ICD-10-CM

## 2024-02-21 DIAGNOSIS — I10 ESSENTIAL HYPERTENSION: ICD-10-CM

## 2024-02-21 DIAGNOSIS — R41.3 CONFABULATION: ICD-10-CM

## 2024-02-21 DIAGNOSIS — F03.918 DEMENTIA WITH BEHAVIORAL DISTURBANCE (HCC): ICD-10-CM

## 2024-02-21 DIAGNOSIS — R63.4 UNINTENTIONAL WEIGHT LOSS: ICD-10-CM

## 2024-02-21 DIAGNOSIS — R54 AGE-RELATED PHYSICAL DEBILITY: ICD-10-CM

## 2024-02-21 PROCEDURE — G0439 PPPS, SUBSEQ VISIT: HCPCS | Performed by: PHYSICIAN ASSISTANT

## 2024-02-21 PROCEDURE — 69209 REMOVE IMPACTED EAR WAX UNI: CPT | Performed by: PHYSICIAN ASSISTANT

## 2024-02-21 RX ORDER — MIRTAZAPINE 7.5 MG/1
7.5 TABLET, FILM COATED ORAL
Qty: 30 TABLET | Refills: 5 | Status: SHIPPED | OUTPATIENT
Start: 2024-02-21 | End: 2024-02-23 | Stop reason: SDUPTHER

## 2024-02-21 NOTE — PATIENT INSTRUCTIONS
Medicare Preventive Visit Patient Instructions  Thank you for completing your Welcome to Medicare Visit or Medicare Annual Wellness Visit today. Your next wellness visit will be due in one year (2/21/2025).  The screening/preventive services that you may require over the next 5-10 years are detailed below. Some tests may not apply to you based off risk factors and/or age. Screening tests ordered at today's visit but not completed yet may show as past due. Also, please note that scanned in results may not display below.  Preventive Screenings:  Service Recommendations Previous Testing/Comments   Colorectal Cancer Screening  Colonoscopy    Fecal Occult Blood Test (FOBT)/Fecal Immunochemical Test (FIT)  Fecal DNA/Cologuard Test  Flexible Sigmoidoscopy Age: 45-75 years old   Colonoscopy: every 10 years (May be performed more frequently if at higher risk)  OR  FOBT/FIT: every 1 year  OR  Cologuard: every 3 years  OR  Sigmoidoscopy: every 5 years  Screening may be recommended earlier than age 45 if at higher risk for colorectal cancer. Also, an individualized decision between you and your healthcare provider will decide whether screening between the ages of 76-85 would be appropriate. Colonoscopy: 09/01/2016  FOBT/FIT: Not on file  Cologuard: Not on file  Sigmoidoscopy: Not on file    Screening Current     Prostate Cancer Screening Individualized decision between patient and health care provider in men between ages of 55-69   Medicare will cover every 12 months beginning on the day after your 50th birthday PSA: No results in last 5 years     Screening Not Indicated     Hepatitis C Screening Once for adults born between 1945 and 1965  More frequently in patients at high risk for Hepatitis C Hep C Antibody: Not on file        Diabetes Screening 1-2 times per year if you're at risk for diabetes or have pre-diabetes Fasting glucose: 98 mg/dL (10/24/2022)  A1C: No results in last 5 years (No results in last 5 years)       Cholesterol Screening Once every 5 years if you don't have a lipid disorder. May order more often based on risk factors. Lipid panel: 11/09/2023  Screening Not Indicated  History Lipid Disorder      Other Preventive Screenings Covered by Medicare:  Abdominal Aortic Aneurysm (AAA) Screening: covered once if your at risk. You're considered to be at risk if you have a family history of AAA or a male between the age of 65-75 who smoking at least 100 cigarettes in your lifetime.  Lung Cancer Screening: covers low dose CT scan once per year if you meet all of the following conditions: (1) Age 55-77; (2) No signs or symptoms of lung cancer; (3) Current smoker or have quit smoking within the last 15 years; (4) You have a tobacco smoking history of at least 20 pack years (packs per day x number of years you smoked); (5) You get a written order from a healthcare provider.  Glaucoma Screening: covered annually if you're considered high risk: (1) You have diabetes OR (2) Family history of glaucoma OR (3)  aged 50 and older OR (4)  American aged 65 and older  Osteoporosis Screening: covered every 2 years if you meet one of the following conditions: (1) Have a vertebral abnormality; (2) On glucocorticoid therapy for more than 3 months; (3) Have primary hyperparathyroidism; (4) On osteoporosis medications and need to assess response to drug therapy.  HIV Screening: covered annually if you're between the age of 15-65. Also covered annually if you are younger than 15 and older than 65 with risk factors for HIV infection. For pregnant patients, it is covered up to 3 times per pregnancy.    Immunizations:  Immunization Recommendations   Influenza Vaccine Annual influenza vaccination during flu season is recommended for all persons aged >= 6 months who do not have contraindications   Pneumococcal Vaccine   * Pneumococcal conjugate vaccine = PCV13 (Prevnar 13), PCV15 (Vaxneuvance), PCV20 (Prevnar 20)  *  Pneumococcal polysaccharide vaccine = PPSV23 (Pneumovax) Adults 19-63 yo with certain risk factors or if 65+ yo  If never received any pneumonia vaccine: recommend Prevnar 20 (PCV20)  Give PCV20 if previously received 1 dose of PCV13 or PPSV23   Hepatitis B Vaccine 3 dose series if at intermediate or high risk (ex: diabetes, end stage renal disease, liver disease)   Respiratory syncytial virus (RSV) Vaccine - COVERED BY MEDICARE PART D  * RSVPreF3 (Arexvy) CDC recommends that adults 60 years of age and older may receive a single dose of RSV vaccine using shared clinical decision-making (SCDM)   Tetanus (Td) Vaccine - COST NOT COVERED BY MEDICARE PART B Following completion of primary series, a booster dose should be given every 10 years to maintain immunity against tetanus. Td may also be given as tetanus wound prophylaxis.   Tdap Vaccine - COST NOT COVERED BY MEDICARE PART B Recommended at least once for all adults. For pregnant patients, recommended with each pregnancy.   Shingles Vaccine (Shingrix) - COST NOT COVERED BY MEDICARE PART B  2 shot series recommended in those 19 years and older who have or will have weakened immune systems or those 50 years and older     Health Maintenance Due:      Topic Date Due   • Hepatitis C Screening  Never done   • Colorectal Cancer Screening  09/01/2026     Immunizations Due:      Topic Date Due   • COVID-19 Vaccine (3 - 2023-24 season) 09/01/2023     Advance Directives   What are advance directives?  Advance directives are legal documents that state your wishes and plans for medical care. These plans are made ahead of time in case you lose your ability to make decisions for yourself. Advance directives can apply to any medical decision, such as the treatments you want, and if you want to donate organs.   What are the types of advance directives?  There are many types of advance directives, and each state has rules about how to use them. You may choose a combination of any of  the following:  Living will:  This is a written record of the treatment you want. You can also choose which treatments you do not want, which to limit, and which to stop at a certain time. This includes surgery, medicine, IV fluid, and tube feedings.   Durable power of  for healthcare (DPAHC):  This is a written record that states who you want to make healthcare choices for you when you are unable to make them for yourself. This person, called a proxy, is usually a family member or a friend. You may choose more than 1 proxy.  Do not resuscitate (DNR) order:  A DNR order is used in case your heart stops beating or you stop breathing. It is a request not to have certain forms of treatment, such as CPR. A DNR order may be included in other types of advance directives.  Medical directive:  This covers the care that you want if you are in a coma, near death, or unable to make decisions for yourself. You can list the treatments you want for each condition. Treatment may include pain medicine, surgery, blood transfusions, dialysis, IV or tube feedings, and a ventilator (breathing machine).  Values history:  This document has questions about your views, beliefs, and how you feel and think about life. This information can help others choose the care that you would choose.  Why are advance directives important?  An advance directive helps you control your care. Although spoken wishes may be used, it is better to have your wishes written down. Spoken wishes can be misunderstood, or not followed. Treatments may be given even if you do not want them. An advance directive may make it easier for your family to make difficult choices about your care.   Fall Prevention    Fall prevention  includes ways to make your home and other areas safer. It also includes ways you can move more carefully to prevent a fall. Health conditions that cause changes in your blood pressure, vision, or muscle strength and coordination may  increase your risk for falls. Medicines may also increase your risk for falls if they make you dizzy, weak, or sleepy.   Fall prevention tips:   Stand or sit up slowly.    Use assistive devices as directed.    Wear shoes that fit well and have soles that .    Wear a personal alarm.    Stay active.    Manage your medical conditions.    Home Safety Tips:  Add items to prevent falls in the bathroom.    Keep paths clear.    Install bright lights in your home.    Keep items you use often on shelves within reach.    Paint or place reflective tape on the edges of your stairs.    Weight Management   Why it is important to manage your weight:  Being overweight increases your risk of health conditions such as heart disease, high blood pressure, type 2 diabetes, and certain types of cancer. It can also increase your risk for osteoarthritis, sleep apnea, and other respiratory problems. Aim for a slow, steady weight loss. Even a small amount of weight loss can lower your risk of health problems.  How to lose weight safely:  A safe and healthy way to lose weight is to eat fewer calories and get regular exercise. You can lose up about 1 pound a week by decreasing the number of calories you eat by 500 calories each day.   Healthy meal plan for weight management:  A healthy meal plan includes a variety of foods, contains fewer calories, and helps you stay healthy. A healthy meal plan includes the following:  Eat whole-grain foods more often.  A healthy meal plan should contain fiber. Fiber is the part of grains, fruits, and vegetables that is not broken down by your body. Whole-grain foods are healthy and provide extra fiber in your diet. Some examples of whole-grain foods are whole-wheat breads and pastas, oatmeal, brown rice, and bulgur.  Eat a variety of vegetables every day.  Include dark, leafy greens such as spinach, kale, daniela greens, and mustard greens. Eat yellow and orange vegetables such as carrots, sweet potatoes,  and winter squash.   Eat a variety of fruits every day.  Choose fresh or canned fruit (canned in its own juice or light syrup) instead of juice. Fruit juice has very little or no fiber.  Eat low-fat dairy foods.  Drink fat-free (skim) milk or 1% milk. Eat fat-free yogurt and low-fat cottage cheese. Try low-fat cheeses such as mozzarella and other reduced-fat cheeses.  Choose meat and other protein foods that are low in fat.  Choose beans or other legumes such as split peas or lentils. Choose fish, skinless poultry (chicken or turkey), or lean cuts of red meat (beef or pork). Before you cook meat or poultry, cut off any visible fat.   Use less fat and oil.  Try baking foods instead of frying them. Add less fat, such as margarine, sour cream, regular salad dressing and mayonnaise to foods. Eat fewer high-fat foods. Some examples of high-fat foods include french fries, doughnuts, ice cream, and cakes.  Eat fewer sweets.  Limit foods and drinks that are high in sugar. This includes candy, cookies, regular soda, and sweetened drinks.  Exercise:  Exercise at least 30 minutes per day on most days of the week. Some examples of exercise include walking, biking, dancing, and swimming. You can also fit in more physical activity by taking the stairs instead of the elevator or parking farther away from stores. Ask your healthcare provider about the best exercise plan for you.      © Copyright [employer] 2018 Information is for End User's use only and may not be sold, redistributed or otherwise used for commercial purposes. All illustrations and images included in CareNotes® are the copyrighted property of [employer] or [employer]    Fall Prevention   AMBULATORY CARE:   Fall prevention  includes ways to make your home and other areas safer. Prevention also includes ways you can move more carefully to prevent a fall. Health conditions that cause changes in your blood pressure, vision, or muscle strength and  coordination may increase your risk for falls. Medicines may also increase your risk for falls if they make you dizzy, weak, or sleepy.  Arrange to have someone call your local emergency number (911 in the [place]) if:   You have fallen and are found unconscious.    You have fallen and cannot move part of your body.    Call your doctor if:   You have fallen and have pain or a headache.    You have questions or concerns about your condition or care.    Fall prevention tips:   Stand or sit up slowly.  This may help you keep your balance and prevent falls.    Use assistive devices as directed.  Your healthcare provider may suggest that you use a cane or walker to help you keep your balance. You may need to have grab bars put in your bathroom near the toilet or in the shower.    Wear shoes that fit well and have soles that .  Wear shoes both inside and outside. Use slippers with good . Do not wear shoes with high heels.    Stay active.  Exercise can help strengthen your muscles and improve your balance. Your healthcare provider may recommend water aerobics or walking. He or she may also recommend physical therapy to improve your coordination. Never start an exercise program without talking to your healthcare provider first.         Manage your medical conditions.  Keep all appointments with your healthcare providers. Visit your eye doctor as directed.    Home safety tips:       Wear a personal alarm.  This is a device that allows you to call for help if you fall. Ask your healthcare provider for more information.    Add items to prevent falls in the bathroom.  Put nonslip strips on your bath or shower floor to prevent you from slipping. Use a bath mat if you do not have carpet in the bathroom. This will prevent you from falling when you step out of the bath or shower. Use a shower seat so you do not need to stand while you shower. Sit on the toilet or a chair in your bathroom to dry yourself and put on clothing. This  will prevent you from losing your balance from drying or dressing yourself while you are standing.    Keep paths clear.  Remove books, shoes, and other objects from walkways and stairs. Place cords for telephones and lamps out of the way so that you do not need to walk over them. Tape them down if you cannot move them. Remove small rugs. If you cannot remove a rug, secure it with double-sided tape. This will prevent you from tripping.    Install bright lights in your home.  Use night lights to help light paths to the bathroom or kitchen. Always turn on the light before you start walking.    Keep items you use often on shelves within reach.  Do not use a step stool to help you reach an item.    Paint or place reflective tape on the edges of your stairs.  This will help you see the stairs better.  Plan ahead in case you do fall:  Talk with family members, friends, and neighbors to create a fall plan. Someone will need to call for emergency help if you are injured or found unconscious. If possible, keep a mobile phone with you at all times, or wear an emergency alert device. You can contact emergency services by pressing a button on the device. Ask your healthcare provider for more information.  Follow up with your doctor as directed:  Write down your questions so you remember to ask them during your visits.  © Copyright [employer] 2023 Information is for End User's use only and may not be sold, redistributed or otherwise used for commercial purposes.  The above information is an  only. It is not intended as medical advice for individual conditions or treatments. Talk to your doctor, nurse or pharmacist before following any medical regimen to see if it is safe and effective for you.

## 2024-02-21 NOTE — PROGRESS NOTES
Assessment and Plan:     Problem List Items Addressed This Visit    None       Preventive health issues were discussed with patient, and age appropriate screening tests were ordered as noted in patient's After Visit Summary.  Personalized health advice and appropriate referrals for health education or preventive services given if needed, as noted in patient's After Visit Summary.     History of Present Illness:     Patient presents for a Medicare Wellness Visit    HPI   Patient Care Team:  Supriya Jefferson PA-C as PCP - General (Physician Assistant)     Review of Systems:     Review of Systems     Problem List:     Patient Active Problem List   Diagnosis    Multi-infarct dementia, uncomplicated (HCC)    Dementia with behavioral disturbance (HCC)    Essential hypertension    Vitamin D deficiency    BMI 27.0-27.9,adult    Attention deficit disorder (ADD) in adult    Mixed hyperlipidemia      Past Medical and Surgical History:     Past Medical History:   Diagnosis Date    Vascular dementia (HCC)      Past Surgical History:   Procedure Laterality Date    REPLACEMENT TOTAL KNEE Left       Family History:     Family History   Problem Relation Age of Onset    Alzheimer's disease Mother     Heart disease Father       Social History:     Social History     Socioeconomic History    Marital status: /Civil Union     Spouse name: None    Number of children: None    Years of education: None    Highest education level: None   Occupational History    None   Tobacco Use    Smoking status: Former     Types: Cigarettes    Smokeless tobacco: Never    Tobacco comments:     quit at 38 years old   Vaping Use    Vaping status: Never Used   Substance and Sexual Activity    Alcohol use: Yes     Comment: on occasion    Drug use: Never    Sexual activity: None   Other Topics Concern    None   Social History Narrative    None     Social Determinants of Health     Financial Resource Strain: Patient Declined (1/16/2023)    Overall  Financial Resource Strain (CARDIA)     Difficulty of Paying Living Expenses: Patient declined   Food Insecurity: Not on file   Transportation Needs: No Transportation Needs (1/16/2023)    PRAPARE - Transportation     Lack of Transportation (Medical): No     Lack of Transportation (Non-Medical): No   Physical Activity: Not on file   Stress: Not on file   Social Connections: Not on file   Intimate Partner Violence: Not on file   Housing Stability: Not on file      Medications and Allergies:     Current Outpatient Medications   Medication Sig Dispense Refill    allopurinol (ZYLOPRIM) 100 mg tablet TAKE ONE TABLET BY MOUTH EVERY MORNING 90 tablet 0    amLODIPine (NORVASC) 5 mg tablet Take 1 tablet (5 mg total) by mouth daily 90 tablet 3    aspirin (ECOTRIN LOW STRENGTH) 81 mg EC tablet Take 81 mg by mouth      b complex vitamins capsule Take 1 capsule by mouth daily      carvedilol (COREG) 6.25 mg tablet Take 1 tablet (6.25 mg total) by mouth in the morning 90 tablet 1    Cholecalciferol 25 MCG (1000 UT) capsule Take 5,000 Units by mouth      donepezil (ARICEPT) 10 mg tablet Take 10 mg by mouth in the morning At night      ergocalciferol (ERGOCALCIFEROL) 1.25 MG (29583 UT) capsule Take 50,000 Units by mouth      memantine (NAMENDA) 10 mg tablet Take 10 mg by mouth 2 (two) times a day Once in morning and once at night      methylphenidate (RITALIN) 10 mg tablet       methylphenidate (RITALIN) 5 mg tablet       pravastatin (PRAVACHOL) 40 mg tablet TAKE ONE TABLET BY MOUTH ONCE DAILY AT BEDTIME 90 tablet 1     No current facility-administered medications for this visit.     Allergies   Allergen Reactions    Sildenafil Visual Disturbance    Atorvastatin Rash    Celecoxib Rash      Immunizations:     Immunization History   Administered Date(s) Administered    COVID-19 PFIZER VACCINE 0.3 ML IM 12/27/2021, 01/20/2022    INFLUENZA 10/03/2022    Influenza Quadrivalent 3 years and older 10/04/2017    Influenza, high dose seasonal  0.7 mL 10/03/2022, 11/21/2023    Influenza, seasonal, injectable 10/03/2008, 09/10/2009, 09/22/2010, 09/06/2011, 10/02/2012, 09/16/2013, 10/15/2014, 09/22/2015, 09/20/2016    Pneumococcal Conjugate Vaccine 20-valent (Pcv20), Polysace 11/21/2023    Pneumococcal Polysaccharide PPV23 11/23/2010, 06/03/2022    Td (adult), adsorbed 10/03/2008      Health Maintenance:         Topic Date Due    Hepatitis C Screening  Never done    Colorectal Cancer Screening  09/01/2026         Topic Date Due    COVID-19 Vaccine (3 - 2023-24 season) 09/01/2023      Medicare Screening Tests and Risk Assessments:         Health Risk Assessment:   Patient rates overall health as very good. Patient feels that their physical health rating is same. Patient is very satisfied with their life. Eyesight was rated as same. Hearing was rated as same. Patient feels that their emotional and mental health rating is same. Patients states they are sometimes angry. Patient states they are never, rarely unusually tired/fatigued. Pain experienced in the last 7 days has been some. Patient's pain rating has been 3/10. Patient states that he has experienced weight loss or gain in last 6 months.     Depression Screening:   PHQ-2 Score: 0      Fall Risk Screening:   In the past year, patient has experienced: history of falling in past year    Number of falls: 2 or more  Injured during fall?: No    Feels unsteady when standing or walking?: Yes    Worried about falling?: Yes      Home Safety:  Patient has trouble with stairs inside or outside of their home. Patient has working smoke alarms and has no working carbon monoxide detector. Home safety hazards include: none.     Nutrition:   Current diet is Regular.     Medications:   Patient is currently taking over-the-counter supplements. OTC medications include: see medication list. Patient is able to manage medications.     Activities of Daily Living (ADLs)/Instrumental Activities of Daily Living (IADLs):   Walk and  "transfer into and out of bed and chair?: Yes  Dress and groom yourself?: Yes    Bathe or shower yourself?: Yes    Feed yourself? Yes  Do your laundry/housekeeping?: No  Manage your money, pay your bills and track your expenses?: Yes  Make your own meals?: Yes    Do your own shopping?: Yes    Previous Hospitalizations:   Any hospitalizations or ED visits within the last 12 months?: No      Advance Care Planning:   Living will: Yes    Durable POA for healthcare: Yes    Advanced directive: Yes    Advanced directive counseling given: Yes    ACP document given: Yes    Patient declined ACP directive: No    End of Life Decisions reviewed with patient: Yes    Provider agrees with end of life decisions: Yes      Cognitive Screening:   Provider or family/friend/caregiver concerned regarding cognition?: No    PREVENTIVE SCREENINGS      Cardiovascular Screening:    General: Screening Not Indicated and History Lipid Disorder      Colorectal Cancer Screening:     General: Screening Current      Prostate Cancer Screening:    General: Screening Not Indicated      Abdominal Aortic Aneurysm (AAA) Screening:    Risk factors include: age between 65-74 yo and tobacco use        Lung Cancer Screening:     General: Screening Not Indicated    Screening, Brief Intervention, and Referral to Treatment (SBIRT)    Screening  Typical number of drinks in a day: 1  Typical number of drinks in a week: 7  Interpretation: Low risk drinking behavior.    Single Item Drug Screening:  How often have you used an illegal drug (including marijuana) or a prescription medication for non-medical reasons in the past year? never    Single Item Drug Screen Score: 0  Interpretation: Negative screen for possible drug use disorder    No results found.     Physical Exam:     /69 (BP Location: Right arm, Patient Position: Sitting, Cuff Size: Standard)   Pulse 79   Ht 5' 5\" (1.651 m)   Wt 69.4 kg (153 lb)   SpO2 95%   BMI 25.46 kg/m²     Physical Exam "     Supriya Jefferson PA-C    Falls Plan of Care: Balance, strength, and gait training instructions were provided.  I did suggest physical therapy in the home since transportation was an issue and the patient and his wife did not want this to occur at this time.  Patient's wife is working and stated she cannot be home when physical therapy is occurring.

## 2024-02-21 NOTE — PROGRESS NOTES
Name: Bakari Oakes      : 1950      MRN: [id number]  Encounter Provider: Supriya Jefferson PA-C  Encounter Date: 2024   Encounter department: [facility] PRIMARY CARE    Assessment & Plan    1. Medicare annual wellness visit, subsequent    2. Alzheimer disease (HCC)  -     Ambulatory Referral to Social Work Care Management Program; Future    3. Unintentional weight loss  -     Ambulatory Referral to Social Work Care Management Program; Future  -     mirtazapine (REMERON) 7.5 MG tablet; Take 1 tablet (7.5 mg total) by mouth daily at bedtime    4. Confabulation    5. Recurrent falls  -     Ambulatory Referral to Social Work Care Management Program; Future    6. Age-related physical debility  -     Ambulatory Referral to Social Work Care Management Program; Future    7. Essential hypertension    8. Adult failure to thrive  -     mirtazapine (REMERON) 7.5 MG tablet; Take 1 tablet (7.5 mg total) by mouth daily at bedtime    9. Dementia with behavioral disturbance (HCC)    10. Bilateral impacted cerumen    Patient was here for Medicare annual wellness visit.  Patient and his wife requested removal of impacted cerumen bilaterally.  Patient had diminished hearing and has a history of growing a lot of earwax.    Patient has a lot of confabulation related to his Alzheimer's disease.  He is also seeing Dr. Shaffer for his impaired memory.  When the patient cannot remember something, he tends to confabulate because he is trying to answer a question.  Patient's wife is working and the patient is left at home while she is working.  He spends the majority of the time sitting on the couch watching television.    He is unintentional weight loss of 14 pounds in the last year and 9 pounds over the last several months.  He has had colonoscopies in the past and is not due for another 2 years.  There is no melena or hematochezia or changes in his bowel habits.  The patient's wife is very reluctant  to agree to a colonoscopy because the bowel prep has been a disaster with fecal material being placed all over the bathroom.    As part of the history, it was noted that the patient is on Ritalin twice a day.  This could be a simple explanation for his unintentional weight loss.  Patient does not qualify for lung cancer screening because he stopped smoking more than 30 years ago.    Patient has a history of hypertension.  He is adherent with his blood pressure medications and his blood pressure is appropriate today in the office.    Patient's wife appears to be very better over the situation that she has with her .  She feels that her  was not honest with her and had a gambling problem causing them to lose many of their assets.  She appears to be very resentful of him.  She is looking for some kind of respite care for him so that she gets a break from caring for him on an ongoing basis.  The patient's son lives in [facility] but he refused to be involved at all in his father's care.  There are no other relatives that would be able to help out the patient's wife.  The patient requested additional help for him and I have consulted  to see what if any services are available as the patient's wife is only getting more frustrated and angry over having to care for him.    Patient's wife does feel comfortable leaving him while she is working but she stated that she has no other choices.  The patient's wife said that the patient does not cook or use any appliances when she is away and that primarily, he just sits in front of the television until she comes home.  She notices that he is not eating as much.  As part of shared decision making, they are willing to try mirtazapine in order to help make food more appealing for him.    We did talk about how unintentional weight loss can be very serious.  At this point, the patient and his wife do not want any aggressive interventions.    A total of 50  "minutes was spent rendering care for this patient.  This time included review of the patient's electronic medical record, performing the history and physical, reviewing appropriate labs and/or images, developing a treatment and assessment plan, answering patient's questions and concerns, and documenting the patient visit.  I will see the patient in 3 months to assess his ongoing medical issues and mental state.  Subjective    HPI: 73-year-old male who appears quiet reserved and cooperative with the exam.  He is alert and oriented x 3.  He answers questions appropriately.  He is able to talk about where he went to high school and what the name of his boat was that he used to have.  He is denying any pain presently including chest pain heart palpitations dizziness lightheadedness.    Patient denies any changes in his bowel or bladder habits.  He denies difficulty passing his water.  He denies any joint aches or pains currently.  He states that he is adherent with all of his medications.    Physical exam: Reviewed vital signs.  He is normotensive.  His BMI is 25.46.  His heart is regular rate without murmur rub or gallops.  Lungs are clear to auscultation.    He had earwax in both ears.  Following the procedure, he was able to identify sounds with AC greater than BC bilaterally with a Santos test not lateralizing.    Objective    /88 (BP Location: Right arm, Patient Position: Sitting, Cuff Size: Standard)   Pulse 79   Ht 5' 5\" (1.651 m)   Wt 69.4 kg (153 lb)   SpO2 95%   BMI 25.46 kg/m²      Ear cerumen removal    Date/Time: 2/21/2024 3:00 PM    Performed by: Supriya Jefferson PA-C  Authorized by: Supriya Jefferson PA-C  Universal Protocol:  Consent: Verbal consent obtained. Written consent not obtained.  Risks and benefits: risks, benefits and alternatives were discussed  Consent given by: patient  Time out: Immediately prior to procedure a \"time out\" was called to verify the correct patient, " procedure, equipment, support staff and site/side marked as required.  Patient understanding: patient states understanding of the procedure being performed  Patient consent: the patient's understanding of the procedure matches consent given  Procedure consent: procedure consent matches procedure scheduled  Relevant documents: relevant documents present and verified  Test results: test results available and properly labeled  Site marked: the operative site was not marked  Radiology Images displayed and confirmed. If images not available, report reviewed: imaging studies available  Required items: required blood products, implants, devices, and special equipment available  Patient identity confirmed: verbally with patient    Patient location:  Clinic  Indications / Diagnosis:  Bilateral impacted cerumen with impaired hearing  Procedure details:     Local anesthetic:  None    Location:  L ear and R ear    Procedure type: irrigation only      Approach:  Natural orifice    Visualization (free text):  Cerumen noted preprocedure and clear TM following procedure    Equipment used:  Irrigation with warm water and hydrogen peroxide, Q-tip removal at orifice  Post-procedure details:     Complication:  None    Hearing quality:  Improved    Patient tolerance of procedure:  Tolerated well, no immediate complications    Supriya Jefferson PA-C

## 2024-02-22 ENCOUNTER — PATIENT OUTREACH (OUTPATIENT)
Dept: FAMILY MEDICINE CLINIC | Facility: CLINIC | Age: 74
End: 2024-02-22

## 2024-02-22 DIAGNOSIS — G30.9 ALZHEIMER DISEASE (HCC): Primary | ICD-10-CM

## 2024-02-22 DIAGNOSIS — R53.81 PHYSICAL DECONDITIONING: ICD-10-CM

## 2024-02-22 DIAGNOSIS — F02.80 ALZHEIMER DISEASE (HCC): Primary | ICD-10-CM

## 2024-02-22 NOTE — PROGRESS NOTES
"OP CM called to pts wife and explained OP CM role.  Explained home health waiver and pt is over income and wife cannot afford to private pay for a home health aide.  Pt resides with wife.  Pt has one step to enter the home.  Pt uses a cane for ambulation.  Wife providers transportation and meals for pt.  Wife is also working from home.  Wife states there are no issues with cost of medication.  Emotional support provided to wife.  Pts wife states pt has one son and when she reached out to pts son for help he said \"he is your problem\"  Pt and wife are not interested in placement.  Pts wife states pt lacks motivation.  Wife states she would like home PT for pt bc she has difficulty getting him out of the house.  TT sent to PCP. Pts wife states she will call back if she has any other needs.    "

## 2024-02-23 ENCOUNTER — TELEPHONE (OUTPATIENT)
Age: 74
End: 2024-02-23

## 2024-02-23 DIAGNOSIS — R62.7 ADULT FAILURE TO THRIVE: ICD-10-CM

## 2024-02-23 DIAGNOSIS — R63.4 UNINTENTIONAL WEIGHT LOSS: ICD-10-CM

## 2024-02-23 RX ORDER — MIRTAZAPINE 7.5 MG/1
7.5 TABLET, FILM COATED ORAL
Qty: 30 TABLET | Refills: 5 | Status: SHIPPED | OUTPATIENT
Start: 2024-02-23

## 2024-02-23 NOTE — TELEPHONE ENCOUNTER
Patient was accepted into [facility] and will be starting as of this weekend.

## 2024-02-26 ENCOUNTER — TELEPHONE (OUTPATIENT)
Age: 74
End: 2024-02-26

## 2024-02-26 NOTE — TELEPHONE ENCOUNTER
Pia from [facility] stated wife refused Home Care for Bakari.    Stated she takes care of him and did not want anyone in the home and she will not be paying for Home Health Care Services.      Please review and advice  Thank you

## 2024-04-02 DIAGNOSIS — M10.00 IDIOPATHIC GOUT, UNSPECIFIED CHRONICITY, UNSPECIFIED SITE: ICD-10-CM

## 2024-04-03 RX ORDER — ALLOPURINOL 100 MG/1
100 TABLET ORAL DAILY
Qty: 30 TABLET | Refills: 0 | Status: SHIPPED | OUTPATIENT
Start: 2024-04-03

## 2024-05-04 DIAGNOSIS — M10.00 IDIOPATHIC GOUT, UNSPECIFIED CHRONICITY, UNSPECIFIED SITE: ICD-10-CM

## 2024-05-06 RX ORDER — ALLOPURINOL 100 MG/1
100 TABLET ORAL DAILY
Qty: 30 TABLET | Refills: 5 | Status: SHIPPED | OUTPATIENT
Start: 2024-05-06

## 2024-05-16 ENCOUNTER — TELEPHONE (OUTPATIENT)
Age: 74
End: 2024-05-16

## 2024-05-16 NOTE — TELEPHONE ENCOUNTER
Pia/[facility]  states patient is accepted by [facility] , and patient will be scheduled for starter care. Marylin advise Pia [phone], if any further questions.

## 2024-05-16 NOTE — TELEPHONE ENCOUNTER
Thank you for letting me know.  Let me know if there are any additional orders that you need me to provide.    Supriya Jefferson

## 2024-05-22 ENCOUNTER — OFFICE VISIT (OUTPATIENT)
Dept: FAMILY MEDICINE CLINIC | Facility: CLINIC | Age: 74
End: 2024-05-22
Payer: COMMERCIAL

## 2024-05-22 VITALS
BODY MASS INDEX: 25.79 KG/M2 | OXYGEN SATURATION: 96 % | DIASTOLIC BLOOD PRESSURE: 73 MMHG | TEMPERATURE: 98.3 F | WEIGHT: 154.98 LBS | SYSTOLIC BLOOD PRESSURE: 138 MMHG | HEART RATE: 65 BPM

## 2024-05-22 DIAGNOSIS — F01.50 MULTI-INFARCT DEMENTIA, UNCOMPLICATED (HCC): ICD-10-CM

## 2024-05-22 DIAGNOSIS — H61.23 BILATERAL IMPACTED CERUMEN: ICD-10-CM

## 2024-05-22 DIAGNOSIS — F98.8 ATTENTION DEFICIT DISORDER (ADD) IN ADULT: ICD-10-CM

## 2024-05-22 DIAGNOSIS — E78.2 MIXED HYPERLIPIDEMIA: ICD-10-CM

## 2024-05-22 DIAGNOSIS — I10 ESSENTIAL HYPERTENSION: Primary | ICD-10-CM

## 2024-05-22 DIAGNOSIS — F03.918 DEMENTIA WITH BEHAVIORAL DISTURBANCE (HCC): ICD-10-CM

## 2024-05-22 PROCEDURE — 99214 OFFICE O/P EST MOD 30 MIN: CPT | Performed by: PHYSICIAN ASSISTANT

## 2024-05-22 PROCEDURE — 69209 REMOVE IMPACTED EAR WAX UNI: CPT | Performed by: PHYSICIAN ASSISTANT

## 2024-05-22 NOTE — PROGRESS NOTES
"Assessment/Plan:    Ear cerumen removal    Date/Time: 5/22/2024 4:00 PM    Performed by: Supriya Jefferson PA-C  Authorized by: Supriya Jefferson PA-C  Universal Protocol:  Consent: Verbal consent obtained. Written consent not obtained.  Risks and benefits: risks, benefits and alternatives were discussed  Consent given by: patient  Time out: Immediately prior to procedure a \"time out\" was called to verify the correct patient, procedure, equipment, support staff and site/side marked as required.  Patient understanding: patient states understanding of the procedure being performed  Patient consent: the patient's understanding of the procedure matches consent given  Procedure consent: procedure consent matches procedure scheduled  Relevant documents: relevant documents present and verified  Test results: test results available and properly labeled  Site marked: the operative site was not marked  Radiology Images displayed and confirmed. If images not available, report reviewed: imaging studies available  Required items: required blood products, implants, devices, and special equipment available  Patient identity confirmed: verbally with patient    Patient location:  Clinic  Indications / Diagnosis:  Impacted cerumen bilaterally left ear greater than right ear  Procedure details:     Local anesthetic:  None    Location:  L ear and R ear    Procedure type: irrigation only      Approach:  Natural orifice    Visualization (free text):  Impacted cerumen seen bilaterally prior to the procedure with clearance of the wax following the procedure.    Equipment used:  Irrigation with tap water and hydrogen peroxide.  Post-procedure details:     Complication:  None    Hearing quality:  Improved    Patient tolerance of procedure:  Tolerated well, no immediate complications       1. Essential hypertension  2. Attention deficit disorder (ADD) in adult  3. Dementia with behavioral disturbance (HCC)  4. Multi-infarct " dementia, uncomplicated (HCC)  5. Mixed hyperlipidemia  6. Bilateral impacted cerumen  -     Ear cerumen removal      73-year-old male who sees me for his primary care services.  He is in for a 3-month follow-up appointment.  He has been started on Aricept due to his memory loss.  He is accompanied in the office by his wife.  There seems to be a great deal of tension with the couple.  It appears that his wife is very resentful of the patient's debility.  Patient's wife states that she gives him his medicine and has to remind him several times to take it.  She also has to remind him to shower on a daily basis.  She is still working and there is some financial stress.    I did tell the patient and his wife that he needs to continue with his blood pressure medications.  He does take a stimulant for his adult ADHD which can also increase his blood pressure.  He is adherent with his lipid-lowering therapy.    I attempted to get home nursing involved with helping with some of the caregiving.  This could not be done as the wife does not want anyone in her home.  She did not want to have any further discussion about this.    Patient's blood pressure is well-controlled.  He is adherent with his medications that his wife provides to him.    Patient denies pain in his chest heart palpitations dizziness or lightheadedness.  He admits decreased hearing secondary to impacted cerumen bilaterally.  He expressed a desire to have his earwax cleaned.  This has been done for him in the past and it is done once again for him today.    A total of 30 minutes was spent rendering care for this patient.  This time included review of the patient's electronic medical record, performing the history and physical, reviewing appropriate labs and/or images, developing a treatment and assessment plan, answering patient's questions and concerns, and documenting the patient visit.  I will see the patient in 4 months for follow-up as he remains stable  at this time.  Subjective:      Patient ID: Bakari Oakes is a 73 y.o. male.    HPI: Pleasant 73-year-old male who uses a cane for ambulation.  He states that he feels much steadier using the cane.  He has not fallen recently.    He is enjoying life.  His PHQ 2 score is 0.  Patient's wife does not notice any change in his mentation.  He is no longer driving.  His wife sold the car because she needed some additional money for the home.  She denies financial difficulty with paying for housing or paying for essentials.    The following portions of the patient's history were reviewed and updated as appropriate: allergies, current medications, past family history, past medical history, past social history, past surgical history, and problem list.    Review of Systems no recent URI or viral syndrome.    Objective:      /73 (BP Location: Right arm, Patient Position: Sitting, Cuff Size: Standard)   Pulse 65   Temp 98.3 °F (36.8 °C) (Tympanic)   Wt 70.3 kg (154 lb 15.7 oz)   SpO2 96%   BMI 25.79 kg/m²          Physical Exam reviewed vital signs.  Blood pressure appropriate.  He is afebrile.  He is pleasant and cooperative and appropriately answers questions.    Ears with cerumen prior to the irrigation.  Santos test does not lateralize.  Iraida test shows AC greater than BC bilaterally.  There is no nuchal adenopathy.    Heart is regular rate without murmur rub or gallops.  Lungs are clear to auscultation.

## 2024-05-23 ENCOUNTER — TELEPHONE (OUTPATIENT)
Age: 74
End: 2024-05-23

## 2024-05-23 NOTE — TELEPHONE ENCOUNTER
[facility] care called in regards to them seeing the patient next week on Tuesday for physical therapy and they will be faxing over paper work for provider to fill out.

## 2024-05-28 ENCOUNTER — TELEPHONE (OUTPATIENT)
Age: 74
End: 2024-05-28

## 2024-05-28 NOTE — TELEPHONE ENCOUNTER
Patient had his PHYSICAL THERAPY plan of care done and they are going to meet with patient once a week for 4 weeks.

## 2024-06-11 ENCOUNTER — OFFICE VISIT (OUTPATIENT)
Dept: FAMILY MEDICINE CLINIC | Facility: CLINIC | Age: 74
End: 2024-06-11
Payer: COMMERCIAL

## 2024-06-11 VITALS
OXYGEN SATURATION: 93 % | HEIGHT: 65 IN | WEIGHT: 154 LBS | HEART RATE: 80 BPM | DIASTOLIC BLOOD PRESSURE: 73 MMHG | BODY MASS INDEX: 25.66 KG/M2 | SYSTOLIC BLOOD PRESSURE: 126 MMHG

## 2024-06-11 DIAGNOSIS — B96.89 ACUTE BACTERIAL SINUSITIS: Primary | ICD-10-CM

## 2024-06-11 DIAGNOSIS — J01.90 ACUTE BACTERIAL SINUSITIS: Primary | ICD-10-CM

## 2024-06-11 DIAGNOSIS — J40 BRONCHITIS: ICD-10-CM

## 2024-06-11 PROCEDURE — G2211 COMPLEX E/M VISIT ADD ON: HCPCS | Performed by: FAMILY MEDICINE

## 2024-06-11 PROCEDURE — 99212 OFFICE O/P EST SF 10 MIN: CPT | Performed by: FAMILY MEDICINE

## 2024-06-11 RX ORDER — GUAIFENESIN 600 MG/1
1200 TABLET, EXTENDED RELEASE ORAL EVERY 12 HOURS SCHEDULED
Qty: 28 TABLET | Refills: 0 | Status: SHIPPED | OUTPATIENT
Start: 2024-06-11 | End: 2024-06-18

## 2024-06-11 RX ORDER — DOXYCYCLINE HYCLATE 100 MG
100 TABLET ORAL 2 TIMES DAILY
Qty: 14 TABLET | Refills: 0 | Status: SHIPPED | OUTPATIENT
Start: 2024-06-11 | End: 2024-06-18

## 2024-06-11 NOTE — PROGRESS NOTES
"Ambulatory Visit  Name: Bakari Oakes      : 1950      MRN: [id number]  Encounter Provider: Elvi Hogan DO  Encounter Date: 2024   Encounter department: [facility] PRIMARY CARE    Assessment & Plan   1. Acute bacterial sinusitis  -     doxycycline hyclate (VIBRA-TABS) 100 mg tablet; Take 1 tablet (100 mg total) by mouth 2 (two) times a day for 7 days  -     guaiFENesin (MUCINEX) 600 mg 12 hr tablet; Take 2 tablets (1,200 mg total) by mouth every 12 (twelve) hours for 7 days  2. Bronchitis  -     doxycycline hyclate (VIBRA-TABS) 100 mg tablet; Take 1 tablet (100 mg total) by mouth 2 (two) times a day for 7 days  -     guaiFENesin (MUCINEX) 600 mg 12 hr tablet; Take 2 tablets (1,200 mg total) by mouth every 12 (twelve) hours for 7 days  73-year-old male with history of dementia, presents with his partner for 3 weeks of worsening cough and upper respiratory congestion.  Given duration of symptoms and risk for patient to develop pneumonia given his age and chronic health conditions, recommend treatment with antibiotics.  Will send for doxycycline twice a day for 7 days and recommend twice a day Mucinex as well.  Encouraged ongoing use of daily antihistamine as well.  Patient to follow-up in the office if symptoms are not improving or if they worsen.    Return if symptoms worsen or fail to improve.         History of Present Illness     HPI  Chief Complaint   Patient presents with    Cough    Nasal Congestion    Heartburn       Has been having symptoms they thought were just allergies. They were doing OTC allergy meds and not seeing much improvement  He is not eating since feeling like this.   He has having a lot coughing, productive, green and \"gunky\"  These symptoms have been ongoing for a couple weeks and not getting better with over the counter medications     Review of Systems   Constitutional:  Positive for activity change, appetite change and fatigue. Negative for fever. "   HENT:  Positive for congestion, postnasal drip and sinus pressure. Negative for ear pain, sinus pain, sore throat and trouble swallowing.    Respiratory:  Positive for cough. Negative for shortness of breath and wheezing.    Cardiovascular:  Negative for chest pain.     Current Outpatient Medications on File Prior to Visit   Medication Sig Dispense Refill    allopurinol (ZYLOPRIM) 100 mg tablet TAKE ONE TABLET BY MOUTH EVERY MORNING 30 tablet 5    amLODIPine (NORVASC) 5 mg tablet Take 1 tablet (5 mg total) by mouth daily 90 tablet 3    aspirin (ECOTRIN LOW STRENGTH) 81 mg EC tablet Take 81 mg by mouth      b complex vitamins capsule Take 1 capsule by mouth daily      carvedilol (COREG) 6.25 mg tablet Take 1 tablet (6.25 mg total) by mouth in the morning 90 tablet 1    Cholecalciferol 25 MCG (1000 UT) capsule Take 5,000 Units by mouth      donepezil (ARICEPT) 10 mg tablet Take 10 mg by mouth in the morning At night      ergocalciferol (ERGOCALCIFEROL) 1.25 MG (72652 UT) capsule Take 50,000 Units by mouth      memantine (NAMENDA) 10 mg tablet Take 10 mg by mouth 2 (two) times a day Once in morning and once at night      methylphenidate (RITALIN) 10 mg tablet       methylphenidate (RITALIN) 5 mg tablet       mirtazapine (REMERON) 7.5 MG tablet Take 1 tablet (7.5 mg total) by mouth daily at bedtime 30 tablet 5    pravastatin (PRAVACHOL) 40 mg tablet TAKE ONE TABLET BY MOUTH ONCE DAILY AT BEDTIME 90 tablet 1     No current facility-administered medications on file prior to visit.      Social History     Tobacco Use    Smoking status: Former     Types: Cigarettes    Smokeless tobacco: Never    Tobacco comments:     quit at 38 years old   Vaping Use    Vaping status: Never Used   Substance and Sexual Activity    Alcohol use: Yes     Comment: on occasion    Drug use: Never    Sexual activity: Not on file     Objective     /73 (BP Location: Left arm, Patient Position: Sitting, Cuff Size: Standard)   Pulse 80   Ht  "5' 5\" (1.651 m)   Wt 69.9 kg (154 lb)   SpO2 93%   BMI 25.63 kg/m²     Physical Exam  Vitals reviewed.   Constitutional:       General: He is not in acute distress.  HENT:      Head: Normocephalic and atraumatic.      Right Ear: External ear normal.      Left Ear: External ear normal.      Nose: Congestion present. No rhinorrhea.      Mouth/Throat:      Mouth: Mucous membranes are moist.      Pharynx: Oropharynx is clear.   Eyes:      Extraocular Movements: Extraocular movements intact.      Conjunctiva/sclera: Conjunctivae normal.   Cardiovascular:      Rate and Rhythm: Normal rate and regular rhythm.   Pulmonary:      Breath sounds: No wheezing, rhonchi or rales.      Comments: Audible upper airway congestion  Chest:      Chest wall: No tenderness.   Neurological:      General: No focal deficit present.      Mental Status: He is alert.   Psychiatric:         Mood and Affect: Mood normal.         Behavior: Behavior normal.       Administrative Statements   I have spent a total time of 15 minutes on 06/11/2024 In caring for this patient including Risks and benefits of tx options, Instructions for management, Patient and family education, Importance of tx compliance, Documenting in the medical record, Reviewing / ordering tests, medicine, procedures  , and Obtaining or reviewing history  .        "

## 2024-07-05 DIAGNOSIS — E78.2 MIXED HYPERLIPIDEMIA: ICD-10-CM

## 2024-07-06 RX ORDER — PRAVASTATIN SODIUM 40 MG
40 TABLET ORAL
Qty: 90 TABLET | Refills: 1 | Status: SHIPPED | OUTPATIENT
Start: 2024-07-06

## 2024-07-22 DIAGNOSIS — I10 ESSENTIAL HYPERTENSION: ICD-10-CM

## 2024-07-23 RX ORDER — CARVEDILOL 6.25 MG/1
6.25 TABLET ORAL DAILY
Qty: 100 TABLET | Refills: 1 | Status: SHIPPED | OUTPATIENT
Start: 2024-07-23

## 2024-08-25 DIAGNOSIS — R63.4 UNINTENTIONAL WEIGHT LOSS: ICD-10-CM

## 2024-08-25 DIAGNOSIS — R62.7 ADULT FAILURE TO THRIVE: ICD-10-CM

## 2024-08-27 RX ORDER — MIRTAZAPINE 7.5 MG/1
7.5 TABLET, FILM COATED ORAL
Qty: 30 TABLET | Refills: 0 | Status: SHIPPED | OUTPATIENT
Start: 2024-08-27

## 2024-09-22 DIAGNOSIS — R62.7 ADULT FAILURE TO THRIVE: ICD-10-CM

## 2024-09-22 DIAGNOSIS — R63.4 UNINTENTIONAL WEIGHT LOSS: ICD-10-CM

## 2024-09-23 ENCOUNTER — OFFICE VISIT (OUTPATIENT)
Dept: FAMILY MEDICINE CLINIC | Facility: CLINIC | Age: 74
End: 2024-09-23
Payer: COMMERCIAL

## 2024-09-23 VITALS
BODY MASS INDEX: 25.66 KG/M2 | SYSTOLIC BLOOD PRESSURE: 139 MMHG | HEIGHT: 65 IN | DIASTOLIC BLOOD PRESSURE: 89 MMHG | WEIGHT: 154 LBS

## 2024-09-23 DIAGNOSIS — F01.50 MULTI-INFARCT DEMENTIA, UNCOMPLICATED (HCC): ICD-10-CM

## 2024-09-23 DIAGNOSIS — M54.50 ACUTE BILATERAL LOW BACK PAIN WITHOUT SCIATICA: ICD-10-CM

## 2024-09-23 DIAGNOSIS — Z23 ENCOUNTER FOR IMMUNIZATION: Primary | ICD-10-CM

## 2024-09-23 DIAGNOSIS — H61.23 BILATERAL IMPACTED CERUMEN: ICD-10-CM

## 2024-09-23 DIAGNOSIS — R29.6 RECURRENT FALLS: ICD-10-CM
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
## 2024-09-23 DIAGNOSIS — R26.2 AMBULATORY DYSFUNCTION: ICD-10-CM

## 2024-09-23 DIAGNOSIS — F98.8 ATTENTION DEFICIT DISORDER (ADD) IN ADULT: ICD-10-CM

## 2024-09-23 DIAGNOSIS — R53.1 GENERALIZED WEAKNESS: ICD-10-CM

## 2024-09-23 DIAGNOSIS — I10 ESSENTIAL HYPERTENSION: ICD-10-CM

## 2024-09-23 DIAGNOSIS — R26.9 GAIT DISTURBANCE: ICD-10-CM

## 2024-09-23 PROCEDURE — G0008 ADMIN INFLUENZA VIRUS VAC: HCPCS

## 2024-09-23 PROCEDURE — 69209 REMOVE IMPACTED EAR WAX UNI: CPT | Performed by: PHYSICIAN ASSISTANT

## 2024-09-23 PROCEDURE — 90662 IIV NO PRSV INCREASED AG IM: CPT

## 2024-09-23 PROCEDURE — 99214 OFFICE O/P EST MOD 30 MIN: CPT | Performed by: PHYSICIAN ASSISTANT

## 2024-09-23 RX ORDER — MIRTAZAPINE 7.5 MG/1
7.5 TABLET, FILM COATED ORAL
Qty: 30 TABLET | Refills: 0 | Status: SHIPPED | OUTPATIENT
Start: 2024-09-23

## 2024-09-23 NOTE — PATIENT INSTRUCTIONS
Patient Education     Preventing falls in adults   The Basics   Written by the doctors and editors at [facility]   Am I at risk of falling? -- Falls can happen to anyone, no matter how old they are. Several things can increase your risk of a fall, including:   Vision problems   Having certain chronic health conditions, being sick, having recently been in the hospital, or having had surgery   Changing the medicines you take   An unsafe or unfamiliar setting (for example, a room with rugs or furniture that might trip you, or an area you don't know well)  Your risk of falling increases as you grow older. That's because getting older can make it harder to walk steadily and keep your balance. Also, the effects of falls are more serious for older people.  Overall, 3 to 4 out of every 10 people over the age of 65 fall each year. Many people who fracture a hip never fully recover to how they were before the fracture. If you have fallen in the past, you are at higher risk of falling again.  How can my doctor help me avoid falling? -- Your doctor can talk to you about the following things:   Past falls - It is important to tell your doctor about any times that you have fallen or almost fallen. They can then suggest ways to prevent another fall.   Your health conditions - Some health problems can put you at risk of falling. These include conditions that affect eyesight, hearing, muscle strength, or balance.   What to do if you are in the hospital - Falls can happen in the hospital because you are in an unfamiliar place. You might feel unsteady or drowsy from medicines or anesthesia. Or you might be attached to catheters or IV tubing that you could trip over. You are also likely to be weaker than usual.   Your medicines - Certain medicines can increase the risk of falling. These include some medicines for sleeping problems, anxiety, high blood pressure, or depression. Adding new medicines, or changing doses of some medicines, can  also affect your risk of falling.  The more your doctor knows about your situation, the better they can help you. For example, if you fell because you have a condition that causes pain, your doctor might suggest treatments to help with the pain. Or if any of your medicines are making you dizzy and more likely to fall, your doctor might switch you to a different medicine.  Is there anything I can do on my own? -- Yes. To help keep from falling, you can:   Make your home safer - To avoid falling at home, get rid of things that might make you trip or slip. This can include furniture, electrical cords, clutter, and loose rugs (figure 1). Keep your home well lit so you can easily see where you are going. Avoid storing things in high places so you don't have to reach or climb.   Wear non-slip socks or sturdy shoes that fit well - Wearing shoes with high heels or slippery soles, or shoes that are too loose, can lead to falls. Walking around in bare feet, or only socks, can also increase your risk of falling.   Take vitamin D pills - Taking vitamin D might lower the risk of falls in older people. This is because vitamin D helps make bones and muscles stronger. Your doctor can talk to you about whether you should take extra vitamin D, and how much.   Stay active - Moving your body regularly can help lower your risk of falling. It might also help prevent you from getting hurt if you do fall. It is best to do a few different activities that help with both strength and balance. There are many kinds of exercise that can be safe for older people. These include walking, swimming, and arnav chi (a Chinese martial art involving slow, gentle movements).   Use a cane, walker, or other safety devices - If your doctor recommends that you use a cane or walker, make sure that it's the right size and you know how to use it. There are other devices that might help you avoid falling, too. These include grab bars or a sturdy seat for the  shower, non-slip bath mats, and hand rails or treads for the stairs (to prevent slipping).   Take extra care if you have had surgery or are in the hospital - Ask for help with getting out of bed, getting up from a chair, or going to the bathroom. Your body needs time to heal, and it's normal to need help with these things while you recover. It can also help to keep your belongings within reach, and avoid walking around in the dark. If you need help, use the call button instead of trying to get up.  If you worry that you could fall, you can get an emergency alert system. This is usually an alarm button that lets you call for help if you fall and can't get up. If you live alone and you do not have an emergency alert system, always carry a cell phone or portable phone with you when moving around the house.  How do I get up from a fall? -- If you do fall, try not to be afraid. Stay calm, and take slow, deep breaths. If someone is near you, call for help right away. If you have an emergency alert system, use it.  Try to find out if you are hurt. If you are hurt badly, trying to get up can make things worse. If you do not think that you are hurt badly, come up with a plan to get up off of the floor.  Some tips to get up after a fall if you are alone:   Look around you for a piece of sturdy furniture such as a couch or chair. Try to move your body to the furniture. You might need to scoot, crawl, or roll to get close. Do these movements very slowly. If you feel any sharp pains, you might need to stop.   Once you are close to the furniture, roll onto your side. Pull your knees up toward your chest, and try to get on your hands and knees.   Put your hands on the seat of the couch or chair.   Bring 1 leg forward so the foot is flat on the floor. If you have a stronger leg, move this leg first.   Now, try to stand up. Once both feet are on the ground, slowly turn and lower yourself to sit down on the seat.  What should I do  after a fall? -- If you had a fall, see your doctor right away, even if you aren't hurt. Your doctor can try to figure out what caused you to fall, and how likely you are to fall again. They will do an exam and talk to you about your health problems, medicines, and activities. They can also check how well you walk, move, and balance. Then, they can suggest things you can do to lower your risk of falling again.  Many older people have a hard time recovering after a fall. Doing things to prevent falling can help you protect your health and independence.  All topics are updated as new evidence becomes available and our peer review process is complete.  This topic retrieved from [employer] on: Apr 04, 2024.  Topic 36833 Version 25.0  Release: 32.2.4 - C32.93  © 2024 UpToDate, Inc. and/or its affiliates. All rights reserved.  figure 1: How to avoid falling at home     This picture shows some of the things that can cause a fall in your home. Look around and remove any loose rugs, electrical cords, clutter, or furniture that could trip you.  Graphic 01734 Version 1.0  Consumer Information Use and Disclaimer   Disclaimer: This generalized information is a limited summary of diagnosis, treatment, and/or medication information. It is not meant to be comprehensive and should be used as a tool to help the user understand and/or assess potential diagnostic and treatment options. It does NOT include all information about conditions, treatments, medications, side effects, or risks that may apply to a specific patient. It is not intended to be medical advice or a substitute for the medical advice, diagnosis, or treatment of a health care provider based on the health care provider's examination and assessment of a patient's specific and unique circumstances. Patients must speak with a health care provider for complete information about their health, medical questions, and treatment options, including any risks or benefits regarding  use of medications. This information does not endorse any treatments or medications as safe, effective, or approved for treating a specific patient. UpToDate, Inc. and its affiliates disclaim any warranty or liability relating to this information or the use thereof.The use of this information is governed by the Terms of Use, available at https://www.[employer].com/en/know/clinical-effectiveness-terms. 2024© UpToDate, Inc. and its affiliates and/or licensors. All rights reserved.  Copyright   © 2024 UpToDate, Inc. and/or its affiliates. All rights reserved.

## 2024-09-23 NOTE — PROGRESS NOTES
"Falls Plan of Care: Patient referred to physical therapy.  Since the patient is bedbound, referral for physical therapy is another in-home referral.  Patient fell again 1 day ago.  Patient does not remember the circumstances of the fall but the fall was witnessed by the patient's wife.  Patient was having difficulty hearing and responding to his wife.  It he was found to have bilateral impacted cerumen.  Patient states that the hearing has improved since the ear lavage has been done.    Ear cerumen removal    Date/Time: 9/23/2024 4:00 PM    Performed by: Supriya Jefferson PA-C  Authorized by: Supriya Jefferson PA-C  Universal Protocol:  procedure performed by consultantConsent: Verbal consent obtained. Written consent not obtained.  Risks and benefits: risks, benefits and alternatives were discussed  Consent given by: patient  Time out: Immediately prior to procedure a \"time out\" was called to verify the correct patient, procedure, equipment, support staff and site/side marked as required.  Patient understanding: patient states understanding of the procedure being performed  Patient consent: the patient's understanding of the procedure matches consent given  Procedure consent: procedure consent matches procedure scheduled  Relevant documents: relevant documents present and verified  Test results: test results available and properly labeled  Site marked: the operative site was not marked  Radiology Images displayed and confirmed. If images not available, report reviewed: imaging studies available  Required items: required blood products, implants, devices, and special equipment available  Patient identity confirmed: verbally with patient    Patient location:  Clinic  Indications / Diagnosis:  Bilaterally impacted earwax and hearing loss  Procedure details:     Local anesthetic:  None    Location:  L ear and R ear    Procedure type: irrigation only      Approach:  Natural orifice    Visualization (free " text):  Impacted cerumen noted prior to procedure.  Following the procedure, all signs of impaction of earwax are dissipated.    Equipment used:  Irrigation with saline and hydrogen peroxide completed  Post-procedure details:     Complication:  None    Hearing quality:  Improved    Patient tolerance of procedure:  Tolerated well, no immediate complications    Assessment/Plan:       1. Encounter for immunization  -     influenza vaccine, high-dose, PF 0.5 mL (Fluzone High Dose)  2. Bilateral impacted cerumen  3. Multi-infarct dementia, uncomplicated (HCC)  4. Essential hypertension  5. Attention deficit disorder (ADD) in adult  6. Acute bilateral low back pain without sciatica  7. Ambulatory dysfunction  -     EXTERNAL Referral to Home Health; Future  8. Fall, initial encounter  -     EXTERNAL Referral to Home Health; Future  9. Recurrent falls  -     Ambulatory referral to Physical Therapy (*VB); Future  10. Generalized weakness  -     Ambulatory referral to Physical Therapy (*VB); Future  11. Gait disturbance  -     Ambulatory referral to Physical Therapy (*VB); Future      This 74-year-old male is seen for a 4-month follow-up.  The patient was accompanied by his wife.  Patient's wife states that the patient continues to have dementia.  She states that he was seen by Dr. Shaffer and was told that the dementia has been stable.  The patient is taking Namenda which is attempting to slow the progression of the dementia.    Patient's wife asked to have the patient's ears lavaged as he is having increasing difficulty with hearing and this was accomplished as part of today's visit.  The patient states that he is feeling well overall.  Patient's wife noted that the patient fell yesterday just stumbled and did not have the ability to stabilize himself from the fall.  He is using a cane at all times to help with ambulation.  As part of shared decision making, [facility] has been consulted for home physical therapy.   The patient's wife continues to work and cannot get him to physical therapy.  He is not driving and he needs a lot of assistance with walking.  No PT referral has been done.    Patient is complaining about pain in his lower back on both sides.  He states he has had this pain for about 30 years.  He is going to do some quad bending to stretch his low back before he gets out a bed in the morning.    Patient did receive a flu shot as part of today's visit.    Patient continues to have difficulty with ambulation and endurance.  He sits at home a great period of time and is not physically very active.  He does spend a lot of time home alone but his wife make sure that he is having access to food while she works.  She states that is necessary for her to continue to work in order to pay their bills.    A total of 45 minutes was spent rendering care for this patient.  This time included review of the patient's electronic medical record, performing the history and physical, reviewing appropriate labs and/or images, developing a treatment and assessment plan, answering patient's questions and concerns, and documenting the patient visit.  I will see the patient for his Medicare annual wellness exam on or after February 24, 2025.  Subjective:      Patient ID: Bakari Oakes is a 74 y.o. male.    HPI: Pleasant 74-year-old male who is very unsteady on his feet even with ambulating with a cane.  Patient had some difficulty getting up on the exam table as part of today's visit.  He is denying pain in his chest or heart palpitations.    He states that he is very happy overall.  His PHQ 2 score is a screen for pression was 0.  Patient states that despite him having falls on a regular basis that he is not being harmed or hurt as a result of those falls.  He does not even recall his falls for the most part.    He denies constipation or diarrhea.  He denies any incontinence.  He denies any joint aches or pains at this time other than his  "continued pain in his low back that has been present for the last 30 years.  He denies any radicular pain.  No saddle anesthesia or changes in his bowel or bladder habits.    The following portions of the patient's history were reviewed and updated as appropriate: allergies, current medications, past family history, past medical history, past social history, past surgical history, and problem list.    Review of Systems no recent URI or viral syndrome.    Objective:      /67 (BP Location: Left arm, Patient Position: Sitting, Cuff Size: Standard)   Ht 5' 5\" (1.651 m)   Wt 69.9 kg (154 lb)   BMI 25.63 kg/m²          Physical Exam : Initial blood pressure had been elevated and repeat blood pressure showed better control.  Initial blood pressure was done as soon as he was in the room.    Patient's heart is fairly regular rate without murmur rub or gallops.  Lungs are clear to auscultation.    Abdomen is round and soft positive bowel sounds.  He has a port that is palpable in the right lower quadrant from his previous gastric sleeve.  No other masses or areas of tenderness are noted.  He had no peripheral edema both legs were cold to the touch but the room was cool and he had shorts on today in the office.  "

## 2024-09-24 ENCOUNTER — TELEPHONE (OUTPATIENT)
Age: 74
End: 2024-09-24

## 2024-09-24 NOTE — TELEPHONE ENCOUNTER
Pia from [facility] called to advise pt has been accepted by [facility] and is requesting clinical notes from last office visit. Please fax to [phone]. Any questions or concerns she can be reached back at [phone].   Thank you.

## 2024-09-30 ENCOUNTER — TELEPHONE (OUTPATIENT)
Age: 74
End: 2024-09-30

## 2024-09-30 NOTE — TELEPHONE ENCOUNTER
Spoke to dionrah relayed message she was out in the field and couldn't really talk much but did get a fax number and faxed over med list so she is updated with that as well as being updated with who is taking care of the ritalin.

## 2024-09-30 NOTE — TELEPHONE ENCOUNTER
Kevin from [facility] stated they are in service with patient as of 9/29.  Seeing patient for Gait training once a week based on evaluation later this week.

## 2024-09-30 NOTE — TELEPHONE ENCOUNTER
Patient's Ritalin is prescribed by Dr. Venancio Shaffer who is a neurologist in [place].  It is 10 mg daily.  The patient recently had a visit in this office with his wife in attendance were all of the medications were reviewed.  Please send a copy of the medications to Anh from [facility].  The Ritalin prescription has never been given by myself but rather the neurologist who is overseeing the patient's mental state.  We are aware that the patient's wife is very resentful of the patient.  We have offered extended care facility placement for the patient and they have declined.    Kevin

## 2024-09-30 NOTE — TELEPHONE ENCOUNTER
Rcvd call from Anh/MAURA/[facility]/[phone]. States she saw patient for the start of care. Has some concerns that patient is left alone too much. Spouse works full-time/self employed cleaning business. Patient has no ST memory and is very impulsive which is why he is falling. She asked spouse for a list of his medications and the spouse told her that it was none of her business. States spouse has been very difficult, wont give information and she had a difficult time arranging to see patient and spouse ended up not being there anyway. States that patient is not safe to be alone. Reviewed med list with PT, need clarification on the ritalin, there are no directions for ritalin, has a 5mg and a 10mg ordered. PT ordered a social service consult.  Therapist said that spouse seems very resentful of patient. Please follow up with Anh/MAURA with clarification of ritalin instructions. Also mentioned that spouse did not have any idea or list of what medications patient is taking.

## 2024-10-02 NOTE — TELEPHONE ENCOUNTER
Patient's spouse called to relay that she does not want any contact with [facility] for PT.  She reports having the following issues with them, and stated she will contact her  if they reach out to her again.    States they told her they are sending in a  to review her finances, to which she absolutely refuses.    States that while trying to schedule a time to come in and she says he is not available, they demand to know where he is going to be.    States that they argued with her, stating their chart says he is bedridden, but he is not.    States they told her he has alzheimer's, but she argues that he sees Neurology and they say he does not.    States they gave her a hard time about his medications, because she did not know the exact name/dosage of all meds.    She feels they are just trying to put him in a home and take all of her money.

## 2024-10-02 NOTE — TELEPHONE ENCOUNTER
Please contact the patient and see if they would like a referral for a different home health agency such [facility].  If they would like a different referral, I am happy to provide a different referral.    Kevin

## 2024-10-03 NOTE — TELEPHONE ENCOUNTER
Called and left a message for Brisa in regards to her  and getting home health care per alvaro dykes

## 2024-10-16 RX ORDER — METHYLPHENIDATE HYDROCHLORIDE 10 MG/1
10 TABLET ORAL
Qty: 20 TABLET | Refills: 0 | Status: CANCELLED | OUTPATIENT
Start: 2024-10-16

## 2024-10-16 NOTE — TELEPHONE ENCOUNTER
Patient called back to cancel request, she got in touch with Dr Shaffer who is refilling his prescription.

## 2024-10-16 NOTE — TELEPHONE ENCOUNTER
Reason for call: Patient cannot get in touch with Venancio Shaffer MD who normally prescribes thismedication. Patients wife stated that she is not sure what is going on but heard that other patients of his have been going to urgent care to have their prescriptions refilled because they are not able to get in contact with the office either. Patient has 2 tablets left and is requesting a 10 days supply until they can get in touch with the office. Please review  [x] Refill   [] Prior Auth  [] Other:     Office:   [x] PCP/Provider - Supriya Jefferson PA-C / Srikanth NEWSOME  [] Specialty/Provider -     Medication: methylphenidate (RITALIN) 10 mg tablet      Dose/Frequency: Take 1 tablet 2 times a day before breakfast and lunch    Quantity: 20    Pharmacy: [id number] - [place] - [address] [place] [address]      Does the patient have enough for 3 days?   [] Yes   [x] No - Send as HP to POD

## 2024-10-23 DIAGNOSIS — R62.7 ADULT FAILURE TO THRIVE: ICD-10-CM

## 2024-10-23 DIAGNOSIS — R63.4 UNINTENTIONAL WEIGHT LOSS: ICD-10-CM

## 2024-10-23 RX ORDER — MIRTAZAPINE 7.5 MG/1
7.5 TABLET, FILM COATED ORAL
Qty: 30 TABLET | Refills: 0 | Status: SHIPPED | OUTPATIENT
Start: 2024-10-23

## 2024-11-17 DIAGNOSIS — M10.00 IDIOPATHIC GOUT, UNSPECIFIED CHRONICITY, UNSPECIFIED SITE: ICD-10-CM

## 2024-11-17 DIAGNOSIS — R63.4 UNINTENTIONAL WEIGHT LOSS: ICD-10-CM

## 2024-11-17 DIAGNOSIS — R62.7 ADULT FAILURE TO THRIVE: ICD-10-CM

## 2024-11-19 RX ORDER — MIRTAZAPINE 7.5 MG/1
7.5 TABLET, FILM COATED ORAL
Qty: 30 TABLET | Refills: 0 | Status: SHIPPED | OUTPATIENT
Start: 2024-11-19

## 2024-11-19 RX ORDER — ALLOPURINOL 100 MG/1
100 TABLET ORAL DAILY
Qty: 30 TABLET | Refills: 0 | Status: SHIPPED | OUTPATIENT
Start: 2024-11-19

## 2024-12-09 DIAGNOSIS — E78.2 MIXED HYPERLIPIDEMIA: ICD-10-CM

## 2024-12-10 RX ORDER — PRAVASTATIN SODIUM 40 MG
40 TABLET ORAL
Qty: 90 TABLET | Refills: 0 | Status: SHIPPED | OUTPATIENT
Start: 2024-12-10

## 2024-12-16 DIAGNOSIS — R63.4 UNINTENTIONAL WEIGHT LOSS: ICD-10-CM

## 2024-12-16 DIAGNOSIS — R62.7 ADULT FAILURE TO THRIVE: ICD-10-CM

## 2024-12-23 RX ORDER — MIRTAZAPINE 7.5 MG/1
7.5 TABLET, FILM COATED ORAL
Qty: 30 TABLET | Refills: 0 | Status: SHIPPED | OUTPATIENT
Start: 2024-12-23

## 2024-12-28 DIAGNOSIS — M10.00 IDIOPATHIC GOUT, UNSPECIFIED CHRONICITY, UNSPECIFIED SITE: ICD-10-CM

## 2024-12-30 RX ORDER — ALLOPURINOL 100 MG/1
100 TABLET ORAL DAILY
Qty: 30 TABLET | Refills: 0 | Status: SHIPPED | OUTPATIENT
Start: 2024-12-30

## 2025-01-20 DIAGNOSIS — R63.4 UNINTENTIONAL WEIGHT LOSS: ICD-10-CM

## 2025-01-20 DIAGNOSIS — R62.7 ADULT FAILURE TO THRIVE: ICD-10-CM

## 2025-01-21 RX ORDER — MIRTAZAPINE 7.5 MG/1
7.5 TABLET, FILM COATED ORAL
Qty: 30 TABLET | Refills: 0 | Status: SHIPPED | OUTPATIENT
Start: 2025-01-21

## 2025-02-05 DIAGNOSIS — M10.00 IDIOPATHIC GOUT, UNSPECIFIED CHRONICITY, UNSPECIFIED SITE: ICD-10-CM

## 2025-02-06 RX ORDER — ALLOPURINOL 100 MG/1
100 TABLET ORAL DAILY
Qty: 30 TABLET | Refills: 0 | Status: SHIPPED | OUTPATIENT
Start: 2025-02-06

## 2025-02-22 DIAGNOSIS — I10 ESSENTIAL HYPERTENSION: ICD-10-CM

## 2025-02-22 DIAGNOSIS — R62.7 ADULT FAILURE TO THRIVE: ICD-10-CM

## 2025-02-22 DIAGNOSIS — R63.4 UNINTENTIONAL WEIGHT LOSS: ICD-10-CM

## 2025-02-25 ENCOUNTER — OFFICE VISIT (OUTPATIENT)
Dept: FAMILY MEDICINE CLINIC | Facility: CLINIC | Age: 75
End: 2025-02-25
Payer: COMMERCIAL

## 2025-02-25 VITALS
OXYGEN SATURATION: 96 % | DIASTOLIC BLOOD PRESSURE: 70 MMHG | TEMPERATURE: 98.2 F | WEIGHT: 146.83 LBS | BODY MASS INDEX: 24.43 KG/M2 | SYSTOLIC BLOOD PRESSURE: 132 MMHG | HEART RATE: 85 BPM

## 2025-02-25 DIAGNOSIS — E78.2 MIXED HYPERLIPIDEMIA: ICD-10-CM

## 2025-02-25 DIAGNOSIS — Z00.00 MEDICARE ANNUAL WELLNESS VISIT, SUBSEQUENT: Primary | ICD-10-CM

## 2025-02-25 DIAGNOSIS — H61.23 BILATERAL IMPACTED CERUMEN: ICD-10-CM

## 2025-02-25 DIAGNOSIS — I10 PRIMARY HYPERTENSION: ICD-10-CM

## 2025-02-25 DIAGNOSIS — F03.918 DEMENTIA WITH BEHAVIORAL DISTURBANCE (HCC): ICD-10-CM

## 2025-02-25 DIAGNOSIS — I10 ESSENTIAL HYPERTENSION: ICD-10-CM

## 2025-02-25 DIAGNOSIS — R26.2 AMBULATORY DYSFUNCTION: ICD-10-CM

## 2025-02-25 PROCEDURE — G0439 PPPS, SUBSEQ VISIT: HCPCS | Performed by: PHYSICIAN ASSISTANT

## 2025-02-25 PROCEDURE — 99213 OFFICE O/P EST LOW 20 MIN: CPT | Performed by: PHYSICIAN ASSISTANT

## 2025-02-25 PROCEDURE — G2211 COMPLEX E/M VISIT ADD ON: HCPCS | Performed by: PHYSICIAN ASSISTANT

## 2025-02-25 RX ORDER — AMLODIPINE BESYLATE 5 MG/1
5 TABLET ORAL DAILY
Qty: 90 TABLET | Refills: 3 | Status: SHIPPED | OUTPATIENT
Start: 2025-02-25

## 2025-02-25 RX ORDER — PRAVASTATIN SODIUM 40 MG
40 TABLET ORAL
Qty: 90 TABLET | Refills: 0 | Status: SHIPPED | OUTPATIENT
Start: 2025-02-25

## 2025-02-25 RX ORDER — MIRTAZAPINE 7.5 MG/1
7.5 TABLET, FILM COATED ORAL
Qty: 90 TABLET | Refills: 1 | Status: SHIPPED | OUTPATIENT
Start: 2025-02-25

## 2025-02-25 RX ORDER — DONEPEZIL HYDROCHLORIDE 10 MG/1
10 TABLET, FILM COATED ORAL DAILY
Qty: 90 TABLET | Refills: 3 | Status: SHIPPED | OUTPATIENT
Start: 2025-02-25

## 2025-02-25 RX ORDER — CARVEDILOL 6.25 MG/1
6.25 TABLET ORAL DAILY
Qty: 100 TABLET | Refills: 0 | Status: SHIPPED | OUTPATIENT
Start: 2025-02-25

## 2025-02-25 RX ORDER — MEMANTINE HYDROCHLORIDE 10 MG/1
10 TABLET ORAL 2 TIMES DAILY
Qty: 90 TABLET | Refills: 3 | Status: SHIPPED | OUTPATIENT
Start: 2025-02-25

## 2025-02-25 NOTE — ASSESSMENT & PLAN NOTE
Patient's wife works so he is home alone with his 2 dogs while she is working.  He does not cook anything in the kitchen.  He does not do laundry but he is able to remove the laundry from the dryer.  He lets the dogs out and lets the back in but does not take them for a walk for fear that they would pull him down.  He has lost approximately 8 pounds since his last visit.  Mirtazapine has been given but he is still not eating a lot of the food that he is given.  Orders:  •  donepezil (ARICEPT) 10 mg tablet; Take 1 tablet (10 mg total) by mouth in the morning At night  •  memantine (NAMENDA) 10 mg tablet; Take 1 tablet (10 mg total) by mouth 2 (two) times a day Once in morning and once at night

## 2025-02-25 NOTE — PATIENT INSTRUCTIONS
Medicare Preventive Visit Patient Instructions  Thank you for completing your Welcome to Medicare Visit or Medicare Annual Wellness Visit today. Your next wellness visit will be due in one year (2/26/2026).  The screening/preventive services that you may require over the next 5-10 years are detailed below. Some tests may not apply to you based off risk factors and/or age. Screening tests ordered at today's visit but not completed yet may show as past due. Also, please note that scanned in results may not display below.  Preventive Screenings:  Service Recommendations Previous Testing/Comments   Colorectal Cancer Screening  Colonoscopy    Fecal Occult Blood Test (FOBT)/Fecal Immunochemical Test (FIT)  Fecal DNA/Cologuard Test  Flexible Sigmoidoscopy Age: 45-75 years old   Colonoscopy: every 10 years (May be performed more frequently if at higher risk)  OR  FOBT/FIT: every 1 year  OR  Cologuard: every 3 years  OR  Sigmoidoscopy: every 5 years  Screening may be recommended earlier than age 45 if at higher risk for colorectal cancer. Also, an individualized decision between you and your healthcare provider will decide whether screening between the ages of 76-85 would be appropriate. Colonoscopy: 09/01/2016  FOBT/FIT: Not on file  Cologuard: Not on file  Sigmoidoscopy: Not on file          Prostate Cancer Screening Individualized decision between patient and health care provider in men between ages of 55-69   Medicare will cover every 12 months beginning on the day after your 50th birthday PSA: No results in last 5 years     Screening Not Indicated     Hepatitis C Screening Once for adults born between 1945 and 1965  More frequently in patients at high risk for Hepatitis C Hep C Antibody: Not on file        Diabetes Screening 1-2 times per year if you're at risk for diabetes or have pre-diabetes Fasting glucose: 98 mg/dL (10/24/2022)  A1C: No results in last 5 years (No results in last 5 years)      Cholesterol Screening  Once every 5 years if you don't have a lipid disorder. May order more often based on risk factors. Lipid panel: 11/09/2023  Screening Not Indicated  History Lipid Disorder      Other Preventive Screenings Covered by Medicare:  Abdominal Aortic Aneurysm (AAA) Screening: covered once if your at risk. You're considered to be at risk if you have a family history of AAA or a male between the age of 65-75 who smoking at least 100 cigarettes in your lifetime.  Lung Cancer Screening: covers low dose CT scan once per year if you meet all of the following conditions: (1) Age 55-77; (2) No signs or symptoms of lung cancer; (3) Current smoker or have quit smoking within the last 15 years; (4) You have a tobacco smoking history of at least 20 pack years (packs per day x number of years you smoked); (5) You get a written order from a healthcare provider.  Glaucoma Screening: covered annually if you're considered high risk: (1) You have diabetes OR (2) Family history of glaucoma OR (3)  aged 50 and older OR (4)  American aged 65 and older  Osteoporosis Screening: covered every 2 years if you meet one of the following conditions: (1) Have a vertebral abnormality; (2) On glucocorticoid therapy for more than 3 months; (3) Have primary hyperparathyroidism; (4) On osteoporosis medications and need to assess response to drug therapy.  HIV Screening: covered annually if you're between the age of 15-65. Also covered annually if you are younger than 15 and older than 65 with risk factors for HIV infection. For pregnant patients, it is covered up to 3 times per pregnancy.    Immunizations:  Immunization Recommendations   Influenza Vaccine Annual influenza vaccination during flu season is recommended for all persons aged >= 6 months who do not have contraindications   Pneumococcal Vaccine   * Pneumococcal conjugate vaccine = PCV13 (Prevnar 13), PCV15 (Vaxneuvance), PCV20 (Prevnar 20)  * Pneumococcal polysaccharide  vaccine = PPSV23 (Pneumovax) Adults 19-65 yo with certain risk factors or if 65+ yo  If never received any pneumonia vaccine: recommend Prevnar 20 (PCV20)  Give PCV20 if previously received 1 dose of PCV13 or PPSV23   Hepatitis B Vaccine 3 dose series if at intermediate or high risk (ex: diabetes, end stage renal disease, liver disease)   Respiratory syncytial virus (RSV) Vaccine - COVERED BY MEDICARE PART D  * RSVPreF3 (Arexvy) CDC recommends that adults 60 years of age and older may receive a single dose of RSV vaccine using shared clinical decision-making (SCDM)   Tetanus (Td) Vaccine - COST NOT COVERED BY MEDICARE PART B Following completion of primary series, a booster dose should be given every 10 years to maintain immunity against tetanus. Td may also be given as tetanus wound prophylaxis.   Tdap Vaccine - COST NOT COVERED BY MEDICARE PART B Recommended at least once for all adults. For pregnant patients, recommended with each pregnancy.   Shingles Vaccine (Shingrix) - COST NOT COVERED BY MEDICARE PART B  2 shot series recommended in those 19 years and older who have or will have weakened immune systems or those 50 years and older     Health Maintenance Due:      Topic Date Due   • Hepatitis C Screening  Never done   • Colorectal Cancer Screening  Discontinued     Immunizations Due:      Topic Date Due   • COVID-19 Vaccine (3 - 2024-25 season) 09/01/2024     Advance Directives   What are advance directives?  Advance directives are legal documents that state your wishes and plans for medical care. These plans are made ahead of time in case you lose your ability to make decisions for yourself. Advance directives can apply to any medical decision, such as the treatments you want, and if you want to donate organs.   What are the types of advance directives?  There are many types of advance directives, and each state has rules about how to use them. You may choose a combination of any of the following:  Living  will:  This is a written record of the treatment you want. You can also choose which treatments you do not want, which to limit, and which to stop at a certain time. This includes surgery, medicine, IV fluid, and tube feedings.   Durable power of  for healthcare (DPAHC):  This is a written record that states who you want to make healthcare choices for you when you are unable to make them for yourself. This person, called a proxy, is usually a family member or a friend. You may choose more than 1 proxy.  Do not resuscitate (DNR) order:  A DNR order is used in case your heart stops beating or you stop breathing. It is a request not to have certain forms of treatment, such as CPR. A DNR order may be included in other types of advance directives.  Medical directive:  This covers the care that you want if you are in a coma, near death, or unable to make decisions for yourself. You can list the treatments you want for each condition. Treatment may include pain medicine, surgery, blood transfusions, dialysis, IV or tube feedings, and a ventilator (breathing machine).  Values history:  This document has questions about your views, beliefs, and how you feel and think about life. This information can help others choose the care that you would choose.  Why are advance directives important?  An advance directive helps you control your care. Although spoken wishes may be used, it is better to have your wishes written down. Spoken wishes can be misunderstood, or not followed. Treatments may be given even if you do not want them. An advance directive may make it easier for your family to make difficult choices about your care.       © Copyright [employer] 2018 Information is for End User's use only and may not be sold, redistributed or otherwise used for commercial purposes. All illustrations and images included in CareNotes® are the copyrighted property of [employer] or [employer]

## 2025-02-25 NOTE — ASSESSMENT & PLAN NOTE
Patient taking pravastatin and is adherent.  He has a history of multi-infarct dementia.  Orders:  •  pravastatin (PRAVACHOL) 40 mg tablet; Take 1 tablet (40 mg total) by mouth daily at bedtime

## 2025-02-25 NOTE — PROGRESS NOTES
Name: Bakari Oakes      : 1950      MRN: [id number]  Encounter Provider: Supriya Jefferson PA-C  Encounter Date: 2025   Encounter department: [facility] PRIMARY [facility]    Assessment & Plan  Medicare annual wellness visit, subsequent  Medicare wellness subsequent visit completed.  We reviewed the answers to his questions.  Patient has multi-infarct dementia and could remember only one of the 3 words.  His clock drawing was not appropriate weaning o'clock to 10 and 11 for 11:10.  He was not able to abstract think.  He was able to spell the word world forward and backward.  He was oriented to person place and time.  He is taking both Namenda and Aricept prescribed by Dr. Amin.  He is also taking pravastatin for his known hyperlipidemia.  Patient's wife ensures that he gets his medications.       Bilateral impacted cerumen  Patient's hearing has been decreased and he had bilateral impacted cerumen.  Ears were cleaned as part of today's visit.       Essential hypertension  On the third taking of blood pressure, patient's blood pressure was appropriate at 132/70.       Dementia with behavioral disturbance (HCC)  Patient's wife works so he is home alone with his 2 dogs while she is working.  He does not cook anything in the kitchen.  He does not do laundry but he is able to remove the laundry from the dryer.  He lets the dogs out and lets the back in but does not take them for a walk for fear that they would pull him down.  He has lost approximately 8 pounds since his last visit.  Mirtazapine has been given but he is still not eating a lot of the food that he is given.  Orders:  •  donepezil (ARICEPT) 10 mg tablet; Take 1 tablet (10 mg total) by mouth in the morning At night  •  memantine (NAMENDA) 10 mg tablet; Take 1 tablet (10 mg total) by mouth 2 (two) times a day Once in morning and once at night    Mixed hyperlipidemia  Patient taking pravastatin and is adherent.  He has a  history of multi-infarct dementia.  Orders:  •  pravastatin (PRAVACHOL) 40 mg tablet; Take 1 tablet (40 mg total) by mouth daily at bedtime    Ambulatory dysfunction  Patient uses a cane whenever he is outside the home.  He navigates within the home by holding onto various furniture.  He has not fallen.  He does not go out much due to his ambulatory dysfunction and this is caused him to have some deconditioning.       Primary hypertension  Patient has a history of elevated blood pressure.  Final blood pressure today was 132/70.  Orders:  •  amLODIPine (NORVASC) 5 mg tablet; Take 1 tablet (5 mg total) by mouth daily       Preventive health issues were discussed with patient, and age appropriate screening tests were ordered as noted in patient's After Visit Summary. Personalized health advice and appropriate referrals for health education or preventive services given if needed, as noted in patient's After Visit Summary.    History of Present Illness     HPI: 74-year-old male with multi-infarct dementia.  Sees Dr. Shaffer for his multi-infarct dementia management.  Patient also has a history of ADHD and is on Ritalin.  This might be interfering with his dietary intake but it helps the patient focus.    He complains of some decreased hearing.  He denies any dizziness lightheadedness or vertigo.  He denies pain in his chest heart palpitations or shortness of breath.  He states that he does not have any incontinence but the patient's wife states that there are times that he has fecal incontinence.  She states he will not wear a depends.  This does not happen often but when it does happen, there is a lot of fecal material that needs to be cleaned up.    Patient feels stable when walking only when using a wall to hang onto or his cane.  He enjoys spending time home alone in the house reading the newspaper and watching television.  He states that his sleep is uninterrupted.  He may have nocturia x 1.  Patient Care  Team:  Supriya Jefferson PA-C as PCP - General (Physician Assistant)    Review of Systems: No recent URI or viral syndrome.  Medical History Reviewed by provider this encounter:  Tobacco  Allergies  Meds  Problems  Med Hx  Surg Hx  Fam Hx       Annual Wellness Visit Questionnaire       Health Risk Assessment:   Patient rates overall health as good. Patient feels that their physical health rating is same. Patient is very satisfied with their life. Eyesight was rated as same. Hearing was rated as much worse. Patient feels that their emotional and mental health rating is slightly better. Patient states they are never, rarely unusually tired/fatigued. Pain experienced in the last 7 days has been some. Patient's pain rating has been 4/10. Patient states that he has experienced no weight loss or gain in last 6 months.     Fall Risk Screening:   In the past year, patient has experienced: no history of falling in past year      Home Safety:  Patient has trouble with stairs inside or outside of their home. Patient has working smoke alarms and has no working carbon monoxide detector. Home safety hazards include: none.     Nutrition:   Current diet is Regular.     Medications:   Patient is currently taking over-the-counter supplements. OTC medications include: see medication list. Patient is able to manage medications.     Activities of Daily Living (ADLs)/Instrumental Activities of Daily Living (IADLs):   Walk and transfer into and out of bed and chair?: Yes  Dress and groom yourself?: Yes    Bathe or shower yourself?: Yes    Feed yourself? Yes  Do your laundry/housekeeping?: Yes  Manage your money, pay your bills and track your expenses?: No  Make your own meals?: No    Do your own shopping?: No    Previous Hospitalizations:   Any hospitalizations or ED visits within the last 12 months?: No      PREVENTIVE SCREENINGS      Cardiovascular Screening:    General: Screening Not Indicated and History Lipid  Disorder      Prostate Cancer Screening:    General: Screening Not Indicated      Abdominal Aortic Aneurysm (AAA) Screening:    Risk factors include: tobacco use        Lung Cancer Screening:     General: Screening Not Indicated    Screening, Brief Intervention, and Referral to Treatment (SBIRT)     Screening  Typical number of drinks in a day: 1  Typical number of drinks in a week: 7  Interpretation: Low risk drinking behavior.    Single Item Drug Screening:  How often have you used an illegal drug (including marijuana) or a prescription medication for non-medical reasons in the past year? never    Single Item Drug Screen Score: 0  Interpretation: Negative screen for possible drug use disorder    Social Drivers of Health     Financial Resource Strain: Low Risk  (2/21/2024)    Overall Financial Resource Strain (CARDIA)    • Difficulty of Paying Living Expenses: Not hard at all   Food Insecurity: No Food Insecurity (2/25/2025)    Hunger Vital Sign    • Worried About Running Out of Food in the Last Year: Never true    • Ran Out of Food in the Last Year: Never true   Transportation Needs: No Transportation Needs (2/25/2025)    PRAPARE - Transportation    • Lack of Transportation (Medical): No    • Lack of Transportation (Non-Medical): No   Housing Stability: Low Risk  (2/25/2025)    Housing Stability Vital Sign    • Unable to Pay for Housing in the Last Year: No    • Number of Times Moved in the Last Year: 0    • Homeless in the Last Year: No   Utilities: Not At Risk (2/25/2025)    [facility] Utilities    • Threatened with loss of utilities: No     No results found.    Objective   /70   Pulse 85   Temp 98.2 °F (36.8 °C) (Tympanic)   Wt 66.6 kg (146 lb 13.2 oz)   SpO2 96%   BMI 24.43 kg/m²     Physical Exam: Reviewed vital signs.  SpO2 is 96%.  Blood pressure is 132/70.  He is oriented to person and place.    Heart is regular rate without murmur rub or gallops.  Lungs are clear to auscultation.  Administrative  Statements   I have spent a total time of 50 minutes in caring for this patient on the day of the visit/encounter including Diagnostic results, Prognosis, Risks and benefits of tx options, Instructions for management, Patient and family education, Impressions, Counseling / Coordination of care, Documenting in the medical record, Reviewing/placing orders in the medical record (including tests, medications, and/or procedures), and Obtaining or reviewing history      I will see the patient in 3 months for a health assessment.  I talked to the wife about the natural course of dementia and how the medications are intended to slow but not reverse the signs of his dementia..

## 2025-03-07 DIAGNOSIS — M10.00 IDIOPATHIC GOUT, UNSPECIFIED CHRONICITY, UNSPECIFIED SITE: ICD-10-CM

## 2025-03-14 RX ORDER — ALLOPURINOL 100 MG/1
100 TABLET ORAL DAILY
Qty: 30 TABLET | Refills: 0 | Status: SHIPPED | OUTPATIENT
Start: 2025-03-14

## 2025-04-13 DIAGNOSIS — M10.00 IDIOPATHIC GOUT, UNSPECIFIED CHRONICITY, UNSPECIFIED SITE: ICD-10-CM

## 2025-04-18 RX ORDER — ALLOPURINOL 100 MG/1
100 TABLET ORAL DAILY
Qty: 30 TABLET | Refills: 0 | Status: SHIPPED | OUTPATIENT
Start: 2025-04-18

## 2025-05-18 DIAGNOSIS — I10 ESSENTIAL HYPERTENSION: ICD-10-CM

## 2025-05-18 DIAGNOSIS — E78.2 MIXED HYPERLIPIDEMIA: ICD-10-CM

## 2025-05-18 DIAGNOSIS — M10.00 IDIOPATHIC GOUT, UNSPECIFIED CHRONICITY, UNSPECIFIED SITE: ICD-10-CM

## 2025-05-19 RX ORDER — PRAVASTATIN SODIUM 40 MG
40 TABLET ORAL
Qty: 90 TABLET | Refills: 0 | Status: SHIPPED | OUTPATIENT
Start: 2025-05-19

## 2025-05-19 RX ORDER — ALLOPURINOL 100 MG/1
100 TABLET ORAL DAILY
Qty: 30 TABLET | Refills: 0 | Status: SHIPPED | OUTPATIENT
Start: 2025-05-19

## 2025-05-19 RX ORDER — CARVEDILOL 6.25 MG/1
6.25 TABLET ORAL DAILY
Qty: 100 TABLET | Refills: 0 | Status: SHIPPED | OUTPATIENT
Start: 2025-05-19

## 2025-05-27 ENCOUNTER — OFFICE VISIT (OUTPATIENT)
Dept: FAMILY MEDICINE CLINIC | Facility: CLINIC | Age: 75
End: 2025-05-27
Payer: COMMERCIAL

## 2025-05-27 VITALS
WEIGHT: 146.83 LBS | DIASTOLIC BLOOD PRESSURE: 64 MMHG | OXYGEN SATURATION: 95 % | HEART RATE: 70 BPM | BODY MASS INDEX: 24.46 KG/M2 | TEMPERATURE: 97.9 F | SYSTOLIC BLOOD PRESSURE: 118 MMHG | HEIGHT: 65 IN

## 2025-05-27 DIAGNOSIS — F03.918 DEMENTIA WITH BEHAVIORAL DISTURBANCE (HCC): Primary | ICD-10-CM

## 2025-05-27 DIAGNOSIS — H61.23 IMPACTED CERUMEN OF BOTH EARS: ICD-10-CM

## 2025-05-27 PROCEDURE — 69209 REMOVE IMPACTED EAR WAX UNI: CPT | Performed by: PHYSICIAN ASSISTANT

## 2025-05-27 PROCEDURE — 99214 OFFICE O/P EST MOD 30 MIN: CPT | Performed by: PHYSICIAN ASSISTANT

## 2025-05-27 NOTE — PROGRESS NOTES
"Name: Bakari Oakes      : 1950      MRN: [id number]  Encounter Provider: Supriya Jefferson PA-C  Encounter Date: 2025   Encounter department: [facility]  :  Assessment & Plan  Dementia with behavioral disturbance (HCC)  Patient was accompanied by his wife.  She make sure that he takes all of his medication.  He also has been seeing Dr. Shaffer for his behavioral disturbance.  Patient's wife feels that his impaired hearing interferes with him being able to understand what people are saying to him and also he tends to be isolated and not getting a lot of stimulation.  He tends to want to stay in the house and not have a lot of activity.  He is going to put candy into bags for the [employer] and this will get him around people.       Impacted cerumen of both ears  Santos test did not lateralize.  Rinne showed AC greater than BC bilaterally before and after impaction removed.  Patient felt that the hearing was improved following irrigation.              History of Present Illness   HPI: 74-year-old male seen with his wife who appears to get very frustrated with the patient.  He admits to difficulty hearing and expressed a desire to have his ears cleaned and this was done as part of today's visit.    Patient continues a gradual downhill course for memory.  His wife make sure that his medications are taken on a daily basis as he cannot be relied on take his medications as ordered.    He is denying pain in his chest heart palpitations dizziness or lightheadedness.  His PHQ 2 score was 0.  He states that he likes the warm weather and is looking forward to the summer.  He is also looking forward to going to a local [employer] pool and being around people.  No other complaints at this time.  Review of Systems: No recent URI or viral syndrome.    Objective   /64   Pulse 70   Temp 97.9 °F (36.6 °C) (Tympanic)   Ht 5' 5\" (1.651 m)   Wt 66.6 kg (146 lb 13.2 oz)   " "SpO2 95%   BMI 24.43 kg/m²      Physical Exam: Pleasant 74-year-old male he was cooperative with the exam.  Reviewed vital signs.  He is normotensive.  He is afebrile.    Heart is regular rate without murmur rub or gallop.  Lungs are clear to auscultation.    Ear cerumen removal    Date/Time: 5/27/2025 3:00 PM    Performed by: Supriya Jefferson PA-C  Authorized by: Supriya Jefferson PA-C    Universal Protocol:  procedure performed by consultantConsent: Verbal consent obtained. Written consent not obtained  Risks and benefits: risks, benefits and alternatives were discussed  Consent given by: patient  Time out: Immediately prior to procedure a \"time out\" was called to verify the correct patient, procedure, equipment, support staff and site/side marked as required.  Patient understanding: patient states understanding of the procedure being performed  Patient consent: the patient's understanding of the procedure matches consent given  Procedure consent: procedure consent matches procedure scheduled  Relevant documents: relevant documents present and verified  Test results: test results available and properly labeled  Site marked: the operative site was not marked  Radiology Images displayed and confirmed. If images not available, report reviewed: imaging studies available  Required items: required blood products, implants, devices, and special equipment available    Patient location:  Clinic  Indications / Diagnosis:  Impacted cerumen bilaterally  Procedure details:     Local anesthetic:  None    Location:  Both ears    Procedure type: irrigation only      Approach:  Natural orifice    Visualization (free text):  Soft cerumen located throughout both canals left canal greater than right    Equipment used:  Irrigation with warm tap water and hydrogen peroxide.  Post-procedure details:     Complication:  None    Hearing quality:  Improved    Patient tolerance of procedure:  Tolerated well, no immediate " complications     Administrative Statements   I have spent a total time of 45 minutes in caring for this patient on the day of the visit/encounter including Diagnostic results, Prognosis, Impressions, Counseling / Coordination of care, Documenting in the medical record, Reviewing/placing orders in the medical record (including tests, medications, and/or procedures), and Obtaining or reviewing history      I will see the patient in 3 months to assess his mental state and dementia and reassess as to whether additional ear irrigation is warranted.

## 2025-05-27 NOTE — ASSESSMENT & PLAN NOTE
Patient was accompanied by his wife.  She make sure that he takes all of his medication.  He also has been seeing Dr. Shaffer for his behavioral disturbance.  Patient's wife feels that his impaired hearing interferes with him being able to understand what people are saying to him and also he tends to be isolated and not getting a lot of stimulation.  He tends to want to stay in the house and not have a lot of activity.  He is going to put candy into bags for the [employer] and this will get him around people.

## 2025-06-21 DIAGNOSIS — M10.00 IDIOPATHIC GOUT, UNSPECIFIED CHRONICITY, UNSPECIFIED SITE: ICD-10-CM

## 2025-06-23 RX ORDER — ALLOPURINOL 100 MG/1
100 TABLET ORAL DAILY
Qty: 30 TABLET | Refills: 0 | Status: SHIPPED | OUTPATIENT
Start: 2025-06-23

## 2025-07-20 DIAGNOSIS — M10.00 IDIOPATHIC GOUT, UNSPECIFIED CHRONICITY, UNSPECIFIED SITE: ICD-10-CM

## 2025-07-24 RX ORDER — ALLOPURINOL 100 MG/1
100 TABLET ORAL DAILY
Qty: 30 TABLET | Refills: 0 | Status: SHIPPED | OUTPATIENT
Start: 2025-07-24

## 2025-08-16 DIAGNOSIS — I10 ESSENTIAL HYPERTENSION: ICD-10-CM

## 2025-08-16 DIAGNOSIS — E78.2 MIXED HYPERLIPIDEMIA: ICD-10-CM

## 2025-08-16 DIAGNOSIS — R62.7 ADULT FAILURE TO THRIVE: ICD-10-CM

## 2025-08-16 DIAGNOSIS — R63.4 UNINTENTIONAL WEIGHT LOSS: ICD-10-CM

## 2025-08-18 RX ORDER — PRAVASTATIN SODIUM 40 MG
40 TABLET ORAL
Qty: 90 TABLET | Refills: 0 | Status: SHIPPED | OUTPATIENT
Start: 2025-08-18

## 2025-08-18 RX ORDER — MIRTAZAPINE 7.5 MG/1
7.5 TABLET, FILM COATED ORAL
Qty: 90 TABLET | Refills: 0 | Status: SHIPPED | OUTPATIENT
Start: 2025-08-18

## 2025-08-18 RX ORDER — CARVEDILOL 6.25 MG/1
6.25 TABLET ORAL DAILY
Qty: 100 TABLET | Refills: 1 | Status: SHIPPED | OUTPATIENT
Start: 2025-08-18